# Patient Record
Sex: MALE | Race: WHITE | NOT HISPANIC OR LATINO | Employment: FULL TIME | URBAN - METROPOLITAN AREA
[De-identification: names, ages, dates, MRNs, and addresses within clinical notes are randomized per-mention and may not be internally consistent; named-entity substitution may affect disease eponyms.]

---

## 2023-03-02 LAB
AMPHETAMINE (PRESENCE) IN URINE BY SCREEN METHOD: NORMAL
BARBITURATES PRESENCE IN URINE BY SCREEN METHOD: NORMAL
BENZODIAZEPINE (PRESENCE) IN URINE BY SCREEN METHOD: NORMAL
CANNABINOIDS IN URINE BY SCREEN METHOD: NORMAL
COCAINE (PRESENCE) IN URINE BY SCREEN METHOD: NORMAL
DRUG SCREEN COMMENT URINE: NORMAL
FENTANYL URINE: NORMAL
METHADONE (PRESENCE) IN URINE BY SCREEN METHOD: NORMAL
OPIATES (PRESENCE) IN URINE BY SCREEN METHOD: NORMAL
OXYCODONE (PRESENCE) IN URINE BY SCREEN METHOD: NORMAL
PHENCYCLIDINE (PRESENCE) IN URINE BY SCREEN METHOD: NORMAL

## 2023-10-19 ENCOUNTER — OFFICE VISIT (OUTPATIENT)
Dept: SURGERY | Facility: CLINIC | Age: 33
End: 2023-10-19
Payer: COMMERCIAL

## 2023-10-19 DIAGNOSIS — R19.7 DIARRHEA, UNSPECIFIED TYPE: Primary | ICD-10-CM

## 2023-10-19 PROCEDURE — 99441 PR PHYS/QHP TELEPHONE EVALUATION 5-10 MIN: CPT | Performed by: NURSE PRACTITIONER

## 2023-10-20 NOTE — PROGRESS NOTES
Subjective   Patient ID: Ladarius Monsalve is a 33 y.o. male with a hx of UC and a total colectomy, who presents today because he is in need of a refill of his medication.    HPI  He has been doing well over the past year.  He has about 10 loose-liquid BM's daily.  No c/o any rectal bleeding but will have a little spotting at times.  No c/o any anal or abdominal pain.  No c/o any weight loss.  He takes Loperamide 3 tablets 4x/day, Lomotil 2 tablets 4x/day, Metamucil and Questran TID and that keeps his stools more bulked.  Review of Systems   All other systems reviewed and are negative.      Objective   Physical Exam    Assessment/Plan   Ladarius has done well over the years since his total colectomy.  He will continue to take his fiber and anti-diarrhea medications to keep his stools bulked.  He will call with any issues and will follow up on an as needed basis.

## 2023-10-23 PROBLEM — R19.7 DIARRHEA: Status: ACTIVE | Noted: 2023-10-23

## 2023-11-20 PROBLEM — K62.5 RECTAL BLEEDING: Status: ACTIVE | Noted: 2023-11-20

## 2023-11-20 PROBLEM — R16.1 SPLENOMEGALY: Status: ACTIVE | Noted: 2023-11-20

## 2023-11-20 PROBLEM — F90.0 ATTENTION DEFICIT HYPERACTIVITY DISORDER (ADHD), PREDOMINANTLY INATTENTIVE TYPE: Status: ACTIVE | Noted: 2023-11-20

## 2023-11-20 PROBLEM — N52.39 POST-PROCEDURAL ERECTILE DYSFUNCTION: Status: ACTIVE | Noted: 2023-11-20

## 2023-11-20 PROBLEM — R17 JAUNDICE: Status: ACTIVE | Noted: 2023-11-20

## 2023-11-20 PROBLEM — K83.01 PSC (PRIMARY SCLEROSING CHOLANGITIS) (CMS-HCC): Status: ACTIVE | Noted: 2023-11-20

## 2023-11-20 PROBLEM — J02.9 ACUTE PHARYNGITIS: Status: ACTIVE | Noted: 2023-11-20

## 2023-11-20 PROBLEM — R59.9 ENLARGED LYMPH NODES: Status: ACTIVE | Noted: 2023-11-20

## 2023-11-20 PROBLEM — K91.850: Status: ACTIVE | Noted: 2023-11-20

## 2023-11-20 PROBLEM — G93.40 ENCEPHALOPATHY: Status: ACTIVE | Noted: 2023-11-20

## 2023-11-20 PROBLEM — R39.12 WEAK URINARY STREAM: Status: ACTIVE | Noted: 2023-11-20

## 2023-11-20 PROBLEM — R17 SCLERAL ICTERUS: Status: ACTIVE | Noted: 2023-11-20

## 2023-11-20 PROBLEM — L29.9 PRURITUS: Status: ACTIVE | Noted: 2023-11-20

## 2023-11-20 PROBLEM — L29.0 PERIANAL IRRITATION: Status: ACTIVE | Noted: 2023-11-20

## 2023-11-20 PROBLEM — R10.11 ABDOMINAL PAIN, RUQ (RIGHT UPPER QUADRANT): Status: ACTIVE | Noted: 2023-11-20

## 2023-11-20 PROBLEM — K83.1 COMMON BILE DUCT (CBD) STRICTURE (CMS-HCC): Status: ACTIVE | Noted: 2023-11-20

## 2023-11-20 PROBLEM — J01.90 SINUSITIS, ACUTE: Status: ACTIVE | Noted: 2023-11-20

## 2023-11-20 PROBLEM — K62.89 ANAL PAIN: Status: ACTIVE | Noted: 2023-11-20

## 2023-11-20 PROBLEM — K92.2 GI BLEED: Status: ACTIVE | Noted: 2023-11-20

## 2023-11-20 PROBLEM — K62.89 PROCTITIS: Status: ACTIVE | Noted: 2023-11-20

## 2023-11-20 RX ORDER — PSYLLIUM HUSK 3.4 G/5.8G
POWDER ORAL
COMMUNITY
Start: 2018-07-27

## 2023-11-20 RX ORDER — METHYLPHENIDATE HYDROCHLORIDE 10 MG/1
TABLET ORAL
COMMUNITY
End: 2023-11-27

## 2023-11-20 RX ORDER — DIPHENOXYLATE HYDROCHLORIDE AND ATROPINE SULFATE 2.5; .025 MG/1; MG/1
TABLET ORAL
COMMUNITY
End: 2024-01-02 | Stop reason: SDUPTHER

## 2023-11-20 RX ORDER — LOPERAMIDE HYDROCHLORIDE 2 MG/1
CAPSULE ORAL
COMMUNITY

## 2023-11-20 RX ORDER — CIPROFLOXACIN 500 MG/1
1 TABLET ORAL EVERY 12 HOURS
COMMUNITY
Start: 2022-06-24 | End: 2024-05-29 | Stop reason: WASHOUT

## 2023-11-20 RX ORDER — CHOLESTYRAMINE 4 G/9G
POWDER, FOR SUSPENSION ORAL
COMMUNITY
Start: 2019-10-02

## 2023-11-20 RX ORDER — MESALAMINE 1000 MG/1
SUPPOSITORY RECTAL
COMMUNITY
Start: 2022-06-24

## 2023-11-20 RX ORDER — SERDEXMETHYLPHENIDATE AND DEXMETHYLPHENIDATE 10.4; 52.3 MG/1; MG/1
1 CAPSULE ORAL EVERY MORNING
COMMUNITY
End: 2023-11-27 | Stop reason: SDUPTHER

## 2023-11-20 RX ORDER — TAMSULOSIN HYDROCHLORIDE 0.4 MG/1
CAPSULE ORAL
COMMUNITY
End: 2024-03-22

## 2023-11-20 RX ORDER — METHYLPHENIDATE HYDROCHLORIDE 36 MG/1
1 TABLET ORAL
COMMUNITY
End: 2023-11-27

## 2023-11-20 RX ORDER — TADALAFIL 5 MG/1
5 TABLET ORAL DAILY
COMMUNITY
End: 2024-02-05 | Stop reason: SDUPTHER

## 2023-11-27 ENCOUNTER — OFFICE VISIT (OUTPATIENT)
Dept: NEUROLOGY | Facility: CLINIC | Age: 33
End: 2023-11-27
Payer: COMMERCIAL

## 2023-11-27 VITALS
HEIGHT: 72 IN | SYSTOLIC BLOOD PRESSURE: 112 MMHG | WEIGHT: 196.8 LBS | BODY MASS INDEX: 26.66 KG/M2 | HEART RATE: 71 BPM | DIASTOLIC BLOOD PRESSURE: 72 MMHG

## 2023-11-27 DIAGNOSIS — G93.40 ENCEPHALOPATHY: ICD-10-CM

## 2023-11-27 DIAGNOSIS — F98.8 ADD (ATTENTION DEFICIT DISORDER) WITHOUT HYPERACTIVITY: Primary | ICD-10-CM

## 2023-11-27 DIAGNOSIS — F90.0 ATTENTION DEFICIT HYPERACTIVITY DISORDER (ADHD), PREDOMINANTLY INATTENTIVE TYPE: ICD-10-CM

## 2023-11-27 PROCEDURE — 99214 OFFICE O/P EST MOD 30 MIN: CPT | Performed by: PSYCHIATRY & NEUROLOGY

## 2023-11-27 PROCEDURE — 1036F TOBACCO NON-USER: CPT | Performed by: PSYCHIATRY & NEUROLOGY

## 2023-11-27 RX ORDER — SERDEXMETHYLPHENIDATE AND DEXMETHYLPHENIDATE 10.4; 52.3 MG/1; MG/1
1 CAPSULE ORAL DAILY
Qty: 30 CAPSULE | Refills: 0 | Status: SHIPPED | OUTPATIENT
Start: 2023-11-26 | End: 2023-12-26

## 2023-11-27 RX ORDER — SERDEXMETHYLPHENIDATE AND DEXMETHYLPHENIDATE 10.4; 52.3 MG/1; MG/1
1 CAPSULE ORAL EVERY MORNING
Qty: 30 CAPSULE | Refills: 0 | Status: SHIPPED | OUTPATIENT
Start: 2023-11-27 | End: 2024-02-14 | Stop reason: SDUPTHER

## 2023-11-27 RX ORDER — SERDEXMETHYLPHENIDATE AND DEXMETHYLPHENIDATE 10.4; 52.3 MG/1; MG/1
1 CAPSULE ORAL DAILY
Qty: 30 CAPSULE | Refills: 0 | Status: SHIPPED | OUTPATIENT
Start: 2024-01-26 | End: 2024-02-14 | Stop reason: SDUPTHER

## 2023-11-27 RX ORDER — SERDEXMETHYLPHENIDATE AND DEXMETHYLPHENIDATE 10.4; 52.3 MG/1; MG/1
1 CAPSULE ORAL EVERY MORNING
Qty: 30 CAPSULE | Refills: 0 | Status: SHIPPED | OUTPATIENT
Start: 2023-11-27 | End: 2023-11-27 | Stop reason: SDUPTHER

## 2023-11-27 ASSESSMENT — ENCOUNTER SYMPTOMS
AGITATION: 0
NECK STIFFNESS: 0
HYPERACTIVE: 0
HALLUCINATIONS: 0
FEVER: 0
SEIZURES: 0
WEAKNESS: 0
SLEEP DISTURBANCE: 0
DIZZINESS: 0
CONFUSION: 0
VOMITING: 0
WHEEZING: 0
ABDOMINAL PAIN: 0
COUGH: 0
FATIGUE: 0
TROUBLE SWALLOWING: 0
BACK PAIN: 0
EYE PAIN: 0
DIFFICULTY URINATING: 0
SINUS PRESSURE: 0
PHOTOPHOBIA: 0
BRUISES/BLEEDS EASILY: 0
PALPITATIONS: 0
NECK PAIN: 0
ADENOPATHY: 0
ARTHRALGIAS: 0
SPEECH DIFFICULTY: 0
NUMBNESS: 0
FACIAL ASYMMETRY: 0
HEADACHES: 0
FREQUENCY: 0
LIGHT-HEADEDNESS: 0
NAUSEA: 0
SHORTNESS OF BREATH: 0
TREMORS: 0
JOINT SWELLING: 0
UNEXPECTED WEIGHT CHANGE: 0

## 2023-11-27 ASSESSMENT — PATIENT HEALTH QUESTIONNAIRE - PHQ9
SUM OF ALL RESPONSES TO PHQ9 QUESTIONS 1 & 2: 0
1. LITTLE INTEREST OR PLEASURE IN DOING THINGS: NOT AT ALL
2. FEELING DOWN, DEPRESSED OR HOPELESS: NOT AT ALL

## 2023-11-27 NOTE — PROGRESS NOTES
Ladarius Monsalve  33 y.o.       SUBJECTIVE    ADHD  Pertinent negatives include no abdominal pain, arthralgias, chest pain, coughing, fatigue, fever, headaches, joint swelling, nausea, neck pain, numbness, rash, vomiting or weakness.      Chandra is a 33-year-old young male who was seen today for follow-up of his encephalopathy due to attention deficit disorder with difficulty at work and home.  Since last seen he has done very well on Azstarys 52.3 mg daily and can tell a big difference when he is on medicine compared to off medicine.  No side effects from the medication.  Today's physical and neurological examination was normal.  I would like to continue his medication the way he is taking and have him come back and see us in 3 months I have discussed the controlled substance policy, abusive potential, risk benefit and the precautions to be taken which she understood      Due to technical limitations of voice recognition and human error, this note may not accurately reflect the care of the patient.    Review of Systems   Constitutional:  Negative for fatigue, fever and unexpected weight change.   HENT:  Negative for dental problem, ear pain, hearing loss, sinus pressure, tinnitus and trouble swallowing.    Eyes:  Negative for photophobia, pain and visual disturbance.   Respiratory:  Negative for cough, shortness of breath and wheezing.    Cardiovascular:  Negative for chest pain, palpitations and leg swelling.   Gastrointestinal:  Negative for abdominal pain, nausea and vomiting.   Genitourinary:  Negative for difficulty urinating, enuresis and frequency.   Musculoskeletal:  Negative for arthralgias, back pain, joint swelling, neck pain and neck stiffness.   Skin:  Negative for pallor and rash.   Allergic/Immunologic: Negative for food allergies.   Neurological:  Negative for dizziness, tremors, seizures, syncope, facial asymmetry, speech difficulty, weakness, light-headedness, numbness and headaches.    Hematological:  Negative for adenopathy. Does not bruise/bleed easily.   Psychiatric/Behavioral:  Negative for agitation, behavioral problems, confusion, hallucinations and sleep disturbance. The patient is not hyperactive.         Patient Active Problem List   Diagnosis    Diarrhea    Abdominal pain, RUQ (right upper quadrant)    Acute pharyngitis    Sinusitis, acute    Anal pain    Attention deficit hyperactivity disorder (ADHD), predominantly inattentive type    Common bile duct (CBD) stricture    Encephalopathy    Enlarged lymph nodes    GI bleed    Jaundice    Perianal irritation    Pruritus    Post-procedural erectile dysfunction    PSC (primary sclerosing cholangitis)    Proctitis    Rectal bleeding    Rectal pouchitis (CMS/HCC)    Scleral icterus    Splenomegaly    Weak urinary stream     Past Medical History:   Diagnosis Date    Other specified symptoms and signs involving the digestive system and abdomen 10/24/2019    High output ileostomy    Personal history of other diseases of the digestive system 05/16/2022    History of ulcerative colitis    Personal history of other mental and behavioral disorders 03/24/2020    History of attention deficit disorder     Past Surgical History:   Procedure Laterality Date    ILEOSTOMY CLOSURE  09/11/2017    Ileostomy Closure    OTHER SURGICAL HISTORY  09/11/2017    Laparoscopy Total Colectomy W/ Proctectomy, Ileostomy    TONSILLECTOMY  08/01/2016    Tonsillectomy       reports that he has never smoked. He has never used smokeless tobacco.    /72 (BP Location: Left arm, Patient Position: Sitting)   Pulse 71   Ht 1.829 m (6')   Wt 89.3 kg (196 lb 12.8 oz)   BMI 26.69 kg/m²     OBJECTIVE  Physical Exam/Neurological Exam   Constitutional: General appearance: no acute distress   Auscultation of Heart: Regular rate and rhythm, no murmurs, normal S1 and S2.   Carotid Arteries: Intact without any bruits.   Neck is supple.   No lymph adenopathy.   Peripheral Vascular  Exam: Pulses +2 and equal in all extremities. No swelling, varicosities, edema or tenderness to palpations.    Abdomen is soft, nondistended. No organomegaly.  Mental status: The patient was in no distress, alert, interactive and cooperative. Affect is appropriate.   Orientation: oriented to person, oriented to place and oriented to time.   Memory: recent memory intact and remote memory intact.   Attention: normal attention span and normal concentrating ability.   Language: normal comprehension and no difficulty naming common objects.   Fund of knowledge: Patient displays adequate knowledge of current events, adequate fund of knowledge regarding past history and adequate fund of knowledge regarding vocabulary.   Eyes: The ophthalmoscopic examination was normal. The fundi are visualized with normal disc margins and without.  Cranial nerve II: Visual fields full to confrontation.   Cranial nerves III, IV, and VI: Pupils round, equally reactive to light; no ptosis. EOMs intact. No nystagmus.   Cranial Nerve V: Facial sensation intact bilaterally.   Cranial nerve VII: Normal and symmetric facial strength.   Cranial nerve VIII: Hearing is intact bilaterally to finger rub / whisper.   Cranial nerves IX and X: Palate elevates symmetrically.   Cranial nerve XI: Shoulder shrug and neck rotation strength are intact.   Cranial nerve XII: Tongue midline with normal strength.   Motor: Motor exam was normal. Muscle bulk was normal in both upper and lower extremities. Muscle tone was normal in both upper and lower extremities. Muscle strength was 5/5 throughout. no abnormal or adventitious movements were present.   Deep Tendon Reflexes: left biceps 2+ , right biceps 2+, left triceps 2+, right triceps 2+, left brachioradialis 2+, right brachioradialis 2+, left patella 2+, right patella 2+, left ankle jerk 2+, right ankle jerk 2+   Plantar Reflex: Toes downgoing to plantar stimulation on the left. Toes downgoing to plantar  stimulation on the right.   Sensory Exam: Normal to light touch.   Coordination: There is no limb dystaxia and rapid alternating movements are intact.  Gait: Gait is normal without spasticity, ataxia or bradykinesia. Stance is stable with a negative Romberg.    ASSESSMENT/PLAN  Diagnoses and all orders for this visit:  ADD (attention deficit disorder) without hyperactivity  -     serdexmethylphen-dexmethylphen (Azstarys) 52.3 mg- 10.4 mg capsule; Take 1 capsule by mouth once daily.  -     serdexmethylphen-dexmethylphen (Azstarys) 52.3 mg- 10.4 mg capsule; Take 1 capsule by mouth once daily. Do not start before January 26, 2024.  -     Azstarys 52.3 mg- 10.4 mg capsule; Take 1 capsule by mouth once daily in the morning.  -     serdexmethylphen-dexmethylphen (Azstarys) 52.3 mg- 10.4 mg capsule; Take 1 capsule by mouth once daily.  -     serdexmethylphen-dexmethylphen (Azstarys) 52.3 mg- 10.4 mg capsule; Take 1 capsule by mouth once daily. Do not start before January 26, 2024.  Encephalopathy  Attention deficit hyperactivity disorder (ADHD), predominantly inattentive type        Pedro Torrez MD  11/27/2023  10:42 AM

## 2024-01-02 DIAGNOSIS — R19.7 DIARRHEA, UNSPECIFIED TYPE: Primary | ICD-10-CM

## 2024-01-02 RX ORDER — DIPHENOXYLATE HYDROCHLORIDE AND ATROPINE SULFATE 2.5; .025 MG/1; MG/1
1 TABLET ORAL
Status: CANCELLED | OUTPATIENT
Start: 2024-01-02 | End: 2024-02-01

## 2024-01-02 RX ORDER — DIPHENOXYLATE HYDROCHLORIDE AND ATROPINE SULFATE 2.5; .025 MG/1; MG/1
TABLET ORAL
Qty: 240 TABLET | Refills: 5 | Status: SHIPPED | OUTPATIENT
Start: 2024-01-02 | End: 2024-03-19

## 2024-01-08 ENCOUNTER — TELEPHONE (OUTPATIENT)
Dept: NEUROLOGY | Facility: CLINIC | Age: 34
End: 2024-01-08
Payer: COMMERCIAL

## 2024-02-05 ENCOUNTER — OFFICE VISIT (OUTPATIENT)
Dept: UROLOGY | Facility: CLINIC | Age: 34
End: 2024-02-05
Payer: COMMERCIAL

## 2024-02-05 VITALS
BODY MASS INDEX: 26.79 KG/M2 | TEMPERATURE: 97.1 F | SYSTOLIC BLOOD PRESSURE: 138 MMHG | HEIGHT: 72 IN | HEART RATE: 86 BPM | DIASTOLIC BLOOD PRESSURE: 80 MMHG | WEIGHT: 197.8 LBS

## 2024-02-05 DIAGNOSIS — N52.39 POST-PROCEDURAL ERECTILE DYSFUNCTION, UNSPECIFIED TYPE: ICD-10-CM

## 2024-02-05 DIAGNOSIS — R33.9 URINARY RETENTION: Primary | ICD-10-CM

## 2024-02-05 PROCEDURE — 1036F TOBACCO NON-USER: CPT | Performed by: NURSE PRACTITIONER

## 2024-02-05 PROCEDURE — 99213 OFFICE O/P EST LOW 20 MIN: CPT | Performed by: NURSE PRACTITIONER

## 2024-02-05 RX ORDER — TADALAFIL 5 MG/1
5 TABLET ORAL DAILY
Qty: 90 TABLET | Refills: 3 | Status: SHIPPED | OUTPATIENT
Start: 2024-02-05 | End: 2024-03-22 | Stop reason: SDUPTHER

## 2024-02-05 NOTE — PROGRESS NOTES
Subjective   Patient ID: Ladarius Monsalve is a 33 y.o. male who presents for Follow-up and Erectile Dysfunction.  Hx of ED and LUTS. Patient with history of urinary retention and severe UC status post total proctocolectomy with loop ileostomy on 9/8/16 and subsequent reversal. He had transient retention after his GI surgery. He has been maintained on tamsulosin 0.4 mg p.o. daily and was started on tadalafil 5 mg p.o. daily in 2022. Tried stopping tamsulosin which caused worsening LUTS.      Denies need for higher doses of tadalafil for ED management.           Review of Systems   All other systems reviewed and are negative.      Objective   Physical Exam  Vitals reviewed.     Alert and oriented x3  Moist mucous membranes  Breathes easily on room air  Abdomen soft, nondistended  No edema  No scleral icterus  No focal neurological deficits  Appears stated age, no acute distress      Assessment/Plan   Diagnoses and all orders for this visit:  Urinary retention  -     Post-Void Residual  -     tadalafil (Cialis) 5 mg tablet; Take 1 tablet (5 mg) by mouth once daily.  Post-procedural erectile dysfunction, unspecified type  -     tadalafil (Cialis) 5 mg tablet; Take 1 tablet (5 mg) by mouth once daily.    Continue with tamsulosin and tadalafil. Annual follow up       LAINA Wolfe-CNP 02/05/24 8:53 AM

## 2024-02-10 NOTE — PROGRESS NOTES
Subjective   Ladarius Monsalve is a 33 y.o.   male.  HPI  The patient is being seen today for their encephalopathy r/t their ADHD. They are currently taking Azstarys and it has been effective. The patient can tell when the medication wears off. He sometimes does not take on his days off or on the weekends and he can tell he gets more annoyed and agitated off the medication. The patient agrees that their quality of life has improved while taking this medication. Patient denies any chest pain, heart palpitations, sleep issues, appetite changes, weight loss, and mood changes while taking this medication. Neurological exam is normal. I have reviewed the medications and the chart. Review of systems are negative unless otherwise specified in HPI.    Objective   Neurological Exam  Mental Status  Awake, alert and oriented to person, place and time. Speech is normal. Language is fluent with no aphasia. Attention and concentration are normal.    Cranial Nerves  CN III, IV, VI: Pupils equal round and reactive to light bilaterally.  And EOM's Normal.    Motor   Strength is 5/5 throughout all four extremities.    Sensory  Light touch is normal in upper and lower extremities.     Reflexes  Deep tendon reflexes are 2+ and symmetric in all four extremities.    Gait  Casual gait is normal including stance, stride, and arm swing.    Physical Exam  Eyes:      Pupils: Pupils are equal, round, and reactive to light.   Cardiovascular:      Rate and Rhythm: Normal rate.   Neurological:      Motor: Motor strength is normal.     Deep Tendon Reflexes: Reflexes are normal and symmetric.   Psychiatric:         Speech: Speech normal.         Assessment/Plan   Discussed following up in 3 months. Medication was sent to pharmacy. Discussed with the patient the purpose of medication, as well as potential side effects to be aware of. Informed the patient about abuse potential of medication and the importance adhering to the controlled substance  agreement. Advised patient to call office with any adverse reaction to medication.

## 2024-02-14 ENCOUNTER — OFFICE VISIT (OUTPATIENT)
Dept: NEUROLOGY | Facility: CLINIC | Age: 34
End: 2024-02-14
Payer: COMMERCIAL

## 2024-02-14 VITALS
DIASTOLIC BLOOD PRESSURE: 72 MMHG | HEART RATE: 105 BPM | HEIGHT: 72 IN | SYSTOLIC BLOOD PRESSURE: 120 MMHG | WEIGHT: 196.6 LBS | BODY MASS INDEX: 26.63 KG/M2

## 2024-02-14 DIAGNOSIS — G93.40 ENCEPHALOPATHY: Primary | ICD-10-CM

## 2024-02-14 DIAGNOSIS — F98.8 ADD (ATTENTION DEFICIT DISORDER) WITHOUT HYPERACTIVITY: ICD-10-CM

## 2024-02-14 PROCEDURE — 99214 OFFICE O/P EST MOD 30 MIN: CPT

## 2024-02-14 PROCEDURE — 1036F TOBACCO NON-USER: CPT

## 2024-02-14 RX ORDER — SERDEXMETHYLPHENIDATE AND DEXMETHYLPHENIDATE 10.4; 52.3 MG/1; MG/1
1 CAPSULE ORAL DAILY
Qty: 30 CAPSULE | Refills: 0 | Status: SHIPPED | OUTPATIENT
Start: 2024-03-13 | End: 2024-04-12

## 2024-02-14 RX ORDER — SERDEXMETHYLPHENIDATE AND DEXMETHYLPHENIDATE 10.4; 52.3 MG/1; MG/1
1 CAPSULE ORAL DAILY
Qty: 30 CAPSULE | Refills: 0 | Status: SHIPPED | OUTPATIENT
Start: 2024-04-13 | End: 2024-05-08 | Stop reason: SDUPTHER

## 2024-02-14 RX ORDER — SERDEXMETHYLPHENIDATE AND DEXMETHYLPHENIDATE 10.4; 52.3 MG/1; MG/1
1 CAPSULE ORAL EVERY MORNING
Qty: 30 CAPSULE | Refills: 0 | Status: SHIPPED | OUTPATIENT
Start: 2024-02-14 | End: 2024-05-08 | Stop reason: SDUPTHER

## 2024-03-22 DIAGNOSIS — R33.9 URINARY RETENTION: ICD-10-CM

## 2024-03-22 DIAGNOSIS — N52.39 POST-PROCEDURAL ERECTILE DYSFUNCTION, UNSPECIFIED TYPE: ICD-10-CM

## 2024-03-22 DIAGNOSIS — R39.12 POOR URINARY STREAM: ICD-10-CM

## 2024-03-22 RX ORDER — TADALAFIL 5 MG/1
5 TABLET ORAL DAILY
Qty: 90 TABLET | Refills: 3 | Status: SHIPPED | OUTPATIENT
Start: 2024-03-22 | End: 2025-03-22

## 2024-03-22 RX ORDER — TAMSULOSIN HYDROCHLORIDE 0.4 MG/1
CAPSULE ORAL
Qty: 90 CAPSULE | Refills: 3 | Status: SHIPPED | OUTPATIENT
Start: 2024-03-22

## 2024-04-26 DIAGNOSIS — R19.7 DIARRHEA, UNSPECIFIED TYPE: ICD-10-CM

## 2024-04-26 RX ORDER — DIPHENOXYLATE HYDROCHLORIDE AND ATROPINE SULFATE 2.5; .025 MG/1; MG/1
TABLET ORAL
Qty: 240 TABLET | Refills: 0 | Status: SHIPPED | OUTPATIENT
Start: 2024-04-26 | End: 2024-06-04

## 2024-05-06 NOTE — PROGRESS NOTES
Subjective   Ladarius Monsalve is a 34 y.o.   male.  HPI  The patient is being seen today for their encephalopathy r/t their ADHD. They are currently taking Azstays and it has been effective. The patient can tell when the medication wears off, usually around night time. The patient agrees that their quality of life has improved while taking this medication. Patient denies any chest pain, heart palpitations, sleep issues, appetite changes, weight loss, and mood changes while taking this medication. Neurological exam is normal. I have reviewed the medications and the chart. Review of systems are negative unless otherwise specified in HPI.      Objective   Neurological Exam  Mental Status  Awake, alert and oriented to person, place and time. Speech is normal. Language is fluent with no aphasia. Attention and concentration are normal.    Cranial Nerves  CN III, IV, VI: Pupils equal round and reactive to light bilaterally.  And EOM's Normal.    Motor   Strength is 5/5 throughout all four extremities.    Sensory  Light touch is normal in upper and lower extremities.     Reflexes  Deep tendon reflexes are 2+ and symmetric in all four extremities.    Gait  Normal casual, toe, heel and tandem gait.    Physical Exam  Eyes:      Pupils: Pupils are equal, round, and reactive to light.   Neurological:      Motor: Motor strength is normal.     Deep Tendon Reflexes: Reflexes are normal and symmetric.   Psychiatric:         Speech: Speech normal.         Assessment/Plan   Discussed following up in 3 months. Medication was sent to pharmacy. Discussed with the patient the purpose of medication, as well as potential side effects to be aware of. Informed the patient about abuse potential of medication and the importance adhering to the controlled substance agreement. Advised patient to call office with any adverse reaction to medication.

## 2024-05-08 ENCOUNTER — OFFICE VISIT (OUTPATIENT)
Dept: NEUROLOGY | Facility: CLINIC | Age: 34
End: 2024-05-08
Payer: COMMERCIAL

## 2024-05-08 VITALS
HEIGHT: 72 IN | DIASTOLIC BLOOD PRESSURE: 70 MMHG | HEART RATE: 83 BPM | BODY MASS INDEX: 26.36 KG/M2 | WEIGHT: 194.6 LBS | SYSTOLIC BLOOD PRESSURE: 110 MMHG

## 2024-05-08 DIAGNOSIS — G93.40 ENCEPHALOPATHY: Primary | ICD-10-CM

## 2024-05-08 DIAGNOSIS — F98.8 ADD (ATTENTION DEFICIT DISORDER) WITHOUT HYPERACTIVITY: ICD-10-CM

## 2024-05-08 PROCEDURE — 80360 METHYLPHENIDATE: CPT

## 2024-05-08 PROCEDURE — 99214 OFFICE O/P EST MOD 30 MIN: CPT

## 2024-05-08 PROCEDURE — 1036F TOBACCO NON-USER: CPT

## 2024-05-08 RX ORDER — SERDEXMETHYLPHENIDATE AND DEXMETHYLPHENIDATE 10.4; 52.3 MG/1; MG/1
1 CAPSULE ORAL EVERY MORNING
Qty: 30 CAPSULE | Refills: 0 | Status: SHIPPED | OUTPATIENT
Start: 2024-05-08

## 2024-05-08 RX ORDER — SERDEXMETHYLPHENIDATE AND DEXMETHYLPHENIDATE 10.4; 52.3 MG/1; MG/1
1 CAPSULE ORAL DAILY
Qty: 30 CAPSULE | Refills: 0 | Status: SHIPPED | OUTPATIENT
Start: 2024-06-07 | End: 2024-07-07

## 2024-05-08 RX ORDER — SERDEXMETHYLPHENIDATE AND DEXMETHYLPHENIDATE 10.4; 52.3 MG/1; MG/1
1 CAPSULE ORAL DAILY
Qty: 30 CAPSULE | Refills: 0 | Status: SHIPPED | OUTPATIENT
Start: 2024-07-07

## 2024-05-12 LAB
ME-PHENIDATE UR-MCNC: 483 NG/ML
PPAA UR-MCNC: >5000 NG/ML

## 2024-05-26 NOTE — PROGRESS NOTES
Hepatology: Initial Office Note     Patient: Ladarius Monsalve, a 34 y.o. year old male presents for evaluation of PSC.   Used to follow up with Dr Masterson in hepatology- last seen in April 2022.   PCP: Leeanna Cannon MD    History of Present Illness   Ladarius Monsalve presents for evaluation of PSC. Used to follow up with Dr Masterson in hepatology- last seen in April 2022.     Diagnosed with PSC: 7-8 years ago.  Notes that he was diagnosed with PSC around the time of undergoing surgery for ulcerative colitis.  Recall some concern around infections which might be affecting the liver/bile duct in the postsurgical period in 2016.  This was around the time when he had the ERCPs.  Since then he denies having any complications from his liver disease are known to him.   Per patient's understanding of his disease process for the liver, he has not been aware of any concerns of advanced fibrosis or cirrhosis.  His last hepatology follow-up was in early 2022.  In December 2023 around the holidays, he had some concerns about becoming jaundiced but seem to self resolve around the new year time.  As a result he scheduled this hepatology visit to establish follow-up/care.  He denies having symptoms of itching, right upper quadrant abdominal pain, fatigue, nausea, vomiting.  Notes that he takes medications to help prevent excessive stool loss/diarrhea.  Follows up with surgery intermittently- last seen in Oct 2023.  Denies changes in appetite or weight.     History of complications of liver disease:   Cholangitis: Denies recent episodes of cholangitis.  Stricture: required stenting and dilation of mid CBD stricture and dilation of right hepatic duct stricture in 2016.   No known hx of malignant stricture.   No known hx of ascites, hepatic encephalopathy or portal HTN that are known.       Review of Systems   Constitutional/ General: No fever   Eyes: improved yellow discoloration  ENT: normal   Cardiovascular: no chest pain, no  palpitations  Respiratory: no shortness of breath  Gastrointestinal: denies abdominal pain, nausea, vomiting  Integumentary: notes dry skin, no yellow discoloration of skin  Neurologic: no weakness or numbness of extremities  Psychiatric: no mood fluctuations  Musculoskeletal: no joint swelling   Genitourinary: no dysuria, no hematuria    All other systems have been reviewed and are negative except as noted in the HPI and above.    PMHx: ulcerative colitis s/p total colectomy with proctectomy- ileostomy, ADD, high output ileostomy.  PSHx: s/p total colectomy with proctectomy- ileostomy 2016, 2017- IPAA  Social hx: alcohol use present (occasional) 1-2 drinks once a month (12 oz) beer; maybe once- twice a year 3-4 drinks in one setting, denies hx of smoking, denies hx of illicit substance use.   Family hx: Maternal grandfather with possibly stomach or pancreatic malignancy.      Medications     Current Outpatient Medications   Medication Instructions    cholestyramine (Questran) 4 gram powder oral    diphenoxylate-atropine (Lomotil) 2.5-0.025 mg tablet TAKE 2 TABLETS BY MOUTH 4 TIMES A DAY AS NEEDED    loperamide (Imodium) 2 mg capsule TAKE 3 CAPSULES BY MOUTH FOUR TIMES DAILY    mesalamine (Canasa) 1,000 mg suppository rectal    psyllium husk, aspartame, (Metamucil Sugar-Free, aspart,) 3.4 gram/5.8 gram powder oral    serdexmethylphen-dexmethylphen (Azstarys) 52.3 mg- 10.4 mg capsule 1 capsule, oral, Every morning    [START ON 6/7/2024] serdexmethylphen-dexmethylphen (Azstarys) 52.3 mg- 10.4 mg capsule 1 capsule, oral, Daily    [START ON 7/7/2024] serdexmethylphen-dexmethylphen (Azstarys) 52.3 mg- 10.4 mg capsule 1 capsule, oral, Daily    tadalafil (CIALIS) 5 mg, oral, Daily    tamsulosin (Flomax) 0.4 mg 24 hr capsule TAKE 1 CAPSULE BY MOUTH ONCE A DAY AT BEDTIME.      Physical Examination     Vitals:    05/29/24 1340   BP: 119/82   Pulse: 84   Resp: 18   Temp: 36.3 °C (97.3 °F)   SpO2: 99%     Vitals:    05/29/24  1340   Weight: 87.5 kg (193 lb)     Body mass index is 26.18 kg/m².    Constitutional: awake, alert   Eyes: EOMI, mildly icteric sclera  ENT: no oropharyngeal lesions visualized  Respiratory: Bilateral air entry equal no wheezing  Cardiovascular: regular rate and rhythm, no lower extremity edema  Abdomen: soft, non tender, non distended, bowel sounds present  Integumentary: no wounds on examined skin   Musculoskeletal: no joint swelling   Neurologic: gross motor strength intact   Psychiatric: alert, appropriate mood and affect, oriented to time/place/person    Labs     Lab Results   Component Value Date     01/18/2023    K 4.2 01/18/2023    CREATININE 0.79 01/18/2023    ALBUMIN 4.4 01/18/2023     (H) 01/18/2023     (H) 01/18/2023    ALKPHOS 561 (H) 01/18/2023    HGB 13.0 (L) 01/18/2023    PLT 80 (L) 01/18/2023      Lab Results   Component Value Date     (H) 01/18/2023     (H) 09/08/2021     (H) 04/09/2021     (H) 12/21/2020     (H) 01/16/2020     (H) 01/18/2023    AST 73 (H) 09/08/2021    AST 68 (H) 04/09/2021     (H) 12/21/2020    AST 74 (H) 01/16/2020    ALKPHOS 561 (H) 01/18/2023    ALKPHOS 488 (H) 09/08/2021    ALKPHOS 402 (H) 04/09/2021    ALKPHOS 498 (H) 12/21/2020    ALKPHOS 398 (H) 01/16/2020    BILITOT 1.4 (H) 01/18/2023    BILITOT 2.3 (H) 09/08/2021    BILITOT 2.0 (H) 04/09/2021    BILITOT 2.0 (H) 12/21/2020    BILITOT 1.5 (H) 01/16/2020    PLT 80 (L) 01/18/2023     (L) 04/09/2021     (L) 12/21/2020    WBC 2.6 (L) 01/18/2023    WBC 3.6 (L) 04/09/2021    WBC 13.3 (H) 12/21/2020    HGB 13.0 (L) 01/18/2023    HGB 15.7 04/09/2021    HGB 17.8 (H) 12/21/2020     Imaging   MRI liver Oct 2021: IMPRESSION: Intrahepatic biliary dilation throughout the right hepatic lobe with irregular beaded appearance of the dilated ductal segments compatible with the history of primary sclerosing cholangitis. Common bile duct is not  dilated.  Partially contracted gallbladder with nonspecific diffuse gallbladder wall thickening as well as small volume pericholecystic fluid/edema.    US Oct 2016:   1.  Findings suggesting strictures involving the proximal and distal common bile duct with associated intra and extrahepatic biliary dilatation.  Additionally, dependent echogenic material within the common duct could represent sludge or noncalcified stones.  No gross masses are identified.  Further evaluation with MRCP and MRI of the liver with contrast is recommended.  2.  Splenomegaly.  3.  Enlarged lymph nodes within the radha hepatis may be reactive in etiology.    April 2017: Findings: Patient is status-post total colectomy with an ileal pouch-anal anastomosis. The exam was otherwise without abnormality.    ERCP 2016 Dec: - One partially occluded stent from the biliary tree was seen in the major papilla.                         - Prior biliary endoscopic sphincterotomy appeared open.                         - One stent was removed from the biliary tree.                         - Improved CBD stricture s/p further dilation, and a stricture in the Rt main duct s/p balloon dilation.    ERCP 2016 Nov:  - Diffuse biliary strictures were found secondary to primary sclerosing cholangitis with a dominant stricture in the mid common bile duct at the take off of the cystic duct. Cytology was obtained.                         - One temporary stent was placed into the common bile duct.  Cytology: A.  COMMON BILE DUCT BRUSH: ATYPICAL CELLS ARE PRESENT, PROBABLY RELATED TO REACTIVE/REPARATIVE PROCESS.     Pouchoscopy Nov 2016: The ileoanal pouch and tramaine-terminal ileum are normal. No specimens collected.    Assessment and Plan    Ladarius TLAI Bello, a 34 y.o. year old male presents for evaluation of PSC. I have reviewed pertinent provider notes, labs and imaging studies. Discussed results and their interpretation with the patient today.    Encounter Diagnoses    Name Primary?    PSC (primary sclerosing cholangitis) (CMS-HCC) Yes    Elevated liver enzymes      Orders Placed This Encounter   Procedures    MR liver w and wo IV contrast w MR elastography    Hepatic Function Panel    CBC and Auto Differential    Basic Metabolic Panel    Protime-INR    Hepatitis A Antibody, Total    Hepatitis B Core Antibody, Total    Hepatitis B Surface Antibody    Hepatitis B Surface Antigen    Hepatitis C Antibody    IgG Subclasses (1, 2, 3, and 4)    Alpha-Fetoprotein    Cancer antigen 19-9    Enhanced Liver Fibrosis Score (ELF); [facility]; 399295 - Miscellaneous Test      # Large duct PSC: Diagnosis based on prior imaging- both MRCP and ERCP  # Hx of elevated liver enzymes: cholestatic pattern of liver enzyme elevation    On clinical evaluation today, patient does not have overt symptoms or signs of decompensated liver disease.   Elevated liver enzymes likely suggest disease activity from PSC.    Discussed the pathophysiology and natural history of primary sclerosing cholangitis with the patient.  Discussed the risk of progression of disease resulting in worsening stricturing, obstructive symptoms, risk of cholangitis, risk of cholangiocarcinoma, cirrhosis, risk of hepatocellular carcinoma.  In addition discussed risk of other malignancies including gallbladder cancer.    - Recommend labs to check LFTs, BMP, CBC, INR, tumor markers- CA 19-9, AFP, viral hep screen- hepatitis A/B/C, autoimmune serologies- TONY/ASMA/AMA/IgG and IgG4.  - Recommend MRI MRCP liver and MRE. Would like to perform fibrosis assessment with MRE and ELF. MRI MRCP to screen for stricturing ds follow up and CCA screen.   - Recommend avoiding all alcohol use.   - Recommend avoiding hepatotoxic agents- including NSAIDs and herbal supplements.   - Pt advised on symptoms to look out for, to reach out to my office.     Follow up visit in 6 months.

## 2024-05-29 ENCOUNTER — OFFICE VISIT (OUTPATIENT)
Dept: GASTROENTEROLOGY | Facility: HOSPITAL | Age: 34
End: 2024-05-29
Payer: COMMERCIAL

## 2024-05-29 VITALS
HEART RATE: 84 BPM | OXYGEN SATURATION: 99 % | WEIGHT: 193 LBS | HEIGHT: 72 IN | SYSTOLIC BLOOD PRESSURE: 119 MMHG | BODY MASS INDEX: 26.14 KG/M2 | RESPIRATION RATE: 18 BRPM | DIASTOLIC BLOOD PRESSURE: 82 MMHG | TEMPERATURE: 97.3 F

## 2024-05-29 DIAGNOSIS — K83.01 PSC (PRIMARY SCLEROSING CHOLANGITIS) (CMS-HCC): Primary | ICD-10-CM

## 2024-05-29 DIAGNOSIS — R74.8 ELEVATED LIVER ENZYMES: ICD-10-CM

## 2024-05-29 PROCEDURE — 99215 OFFICE O/P EST HI 40 MIN: CPT | Performed by: STUDENT IN AN ORGANIZED HEALTH CARE EDUCATION/TRAINING PROGRAM

## 2024-05-29 PROCEDURE — 1036F TOBACCO NON-USER: CPT | Performed by: STUDENT IN AN ORGANIZED HEALTH CARE EDUCATION/TRAINING PROGRAM

## 2024-05-29 ASSESSMENT — PAIN SCALES - GENERAL: PAINLEVEL: 0-NO PAIN

## 2024-05-29 NOTE — PATIENT INSTRUCTIONS
Ladarius Monsalve,     Thank you for coming in for your hepatology office visit today. As per our discussion, I recommend:     I recommend having labs done for evaluation of the liver.   Have an MRI MRCP and MR elastography of the liver done. Call [phone] to schedule this.   Monitor for any symptoms of abdominal pain, nausea, vomiting, fever, chills, fatigue, jaundice. Reach out to my office in case of any symptoms.   Avoid all alcohol use.   Pending results, will advise you on additional recommendations/testing if needed.     I would like to see you back in the office in 6 months.   If you are not provided with a date for your follow up visit at the end of your encounter today at the check out desk, I would encourage you to call [phone] to schedule your appointment with me.   If you have any trouble or need assistance with having this done, please reach out to my 's office at [phone] (Ms Isadora Magaña) or my nurse coordinator at [phone] (Ms Ondina GALICIA).     I look forward to seeing you again. Thank you for allowing me to participate in your care.     Sincerely,  Lisa Crenshaw MD  Hepatology

## 2024-06-06 ENCOUNTER — LAB (OUTPATIENT)
Dept: LAB | Facility: LAB | Age: 34
End: 2024-06-06
Payer: COMMERCIAL

## 2024-06-06 DIAGNOSIS — K83.01 PSC (PRIMARY SCLEROSING CHOLANGITIS) (CMS-HCC): ICD-10-CM

## 2024-06-06 DIAGNOSIS — R74.8 ELEVATED LIVER ENZYMES: ICD-10-CM

## 2024-06-06 LAB
AFP SERPL-MCNC: <4 NG/ML (ref 0–9)
ALBUMIN SERPL BCP-MCNC: 4.4 G/DL (ref 3.4–5)
ALP SERPL-CCNC: 580 U/L (ref 33–120)
ALT SERPL W P-5'-P-CCNC: 264 U/L (ref 10–52)
ANION GAP SERPL CALC-SCNC: 12 MMOL/L (ref 10–20)
AST SERPL W P-5'-P-CCNC: 139 U/L (ref 9–39)
BASOPHILS # BLD AUTO: 0.03 X10*3/UL (ref 0–0.1)
BASOPHILS NFR BLD AUTO: 1.5 %
BILIRUB DIRECT SERPL-MCNC: 1 MG/DL (ref 0–0.3)
BILIRUB SERPL-MCNC: 2.9 MG/DL (ref 0–1.2)
BUN SERPL-MCNC: 13 MG/DL (ref 6–23)
CALCIUM SERPL-MCNC: 9.3 MG/DL (ref 8.6–10.3)
CANCER AG19-9 SERPL-ACNC: <4 U/ML
CHLORIDE SERPL-SCNC: 104 MMOL/L (ref 98–107)
CO2 SERPL-SCNC: 26 MMOL/L (ref 21–32)
CREAT SERPL-MCNC: 0.94 MG/DL (ref 0.5–1.3)
EGFRCR SERPLBLD CKD-EPI 2021: >90 ML/MIN/1.73M*2
EOSINOPHIL # BLD AUTO: 0.14 X10*3/UL (ref 0–0.7)
EOSINOPHIL NFR BLD AUTO: 7 %
ERYTHROCYTE [DISTWIDTH] IN BLOOD BY AUTOMATED COUNT: 19 % (ref 11.5–14.5)
GLUCOSE SERPL-MCNC: 91 MG/DL (ref 74–99)
HAV AB SER QL IA: REACTIVE
HBV CORE AB SER QL: NONREACTIVE
HBV SURFACE AB SER-ACNC: 86.2 MIU/ML
HBV SURFACE AG SERPL QL IA: NONREACTIVE
HCT VFR BLD AUTO: 37.4 % (ref 41–52)
HCV AB SER QL: NONREACTIVE
HGB BLD-MCNC: 12 G/DL (ref 13.5–17.5)
IGA SERPL-MCNC: 139 MG/DL (ref 70–400)
IGG SERPL-MCNC: 1270 MG/DL (ref 700–1600)
IGG SERPL-MCNC: 1270 MG/DL (ref 700–1600)
IGG1 SER-MCNC: 860 MG/DL (ref 490–1140)
IGG2 SER-MCNC: 400 MG/DL (ref 150–640)
IGG3 SER-MCNC: 37 MG/DL (ref 11–85)
IGG4 SER-MCNC: 96 MG/DL (ref 3–200)
IGM SERPL-MCNC: 157 MG/DL (ref 40–230)
IMM GRANULOCYTES # BLD AUTO: 0 X10*3/UL (ref 0–0.7)
IMM GRANULOCYTES NFR BLD AUTO: 0 % (ref 0–0.9)
INR PPP: 1.1 (ref 0.9–1.1)
LYMPHOCYTES # BLD AUTO: 0.62 X10*3/UL (ref 1.2–4.8)
LYMPHOCYTES NFR BLD AUTO: 31 %
MCH RBC QN AUTO: 25.9 PG (ref 26–34)
MCHC RBC AUTO-ENTMCNC: 32.1 G/DL (ref 32–36)
MCV RBC AUTO: 81 FL (ref 80–100)
MONOCYTES # BLD AUTO: 0.17 X10*3/UL (ref 0.1–1)
MONOCYTES NFR BLD AUTO: 8.5 %
NEUTROPHILS # BLD AUTO: 1.04 X10*3/UL (ref 1.2–7.7)
NEUTROPHILS NFR BLD AUTO: 52 %
NRBC BLD-RTO: 0 /100 WBCS (ref 0–0)
PLATELET # BLD AUTO: 67 X10*3/UL (ref 150–450)
POTASSIUM SERPL-SCNC: 4 MMOL/L (ref 3.5–5.3)
PROT SERPL-MCNC: 7.2 G/DL (ref 6.4–8.2)
PROTHROMBIN TIME: 11.9 SECONDS (ref 9.8–12.8)
RBC # BLD AUTO: 4.64 X10*6/UL (ref 4.5–5.9)
SODIUM SERPL-SCNC: 138 MMOL/L (ref 136–145)
WBC # BLD AUTO: 2 X10*3/UL (ref 4.4–11.3)

## 2024-06-06 PROCEDURE — 80053 COMPREHEN METABOLIC PANEL: CPT

## 2024-06-06 PROCEDURE — 86301 IMMUNOASSAY TUMOR CA 19-9: CPT

## 2024-06-06 PROCEDURE — 86803 HEPATITIS C AB TEST: CPT

## 2024-06-06 PROCEDURE — 36415 COLL VENOUS BLD VENIPUNCTURE: CPT

## 2024-06-06 PROCEDURE — 82105 ALPHA-FETOPROTEIN SERUM: CPT

## 2024-06-06 PROCEDURE — 82784 ASSAY IGA/IGD/IGG/IGM EACH: CPT

## 2024-06-06 PROCEDURE — 86038 ANTINUCLEAR ANTIBODIES: CPT

## 2024-06-06 PROCEDURE — 86708 HEPATITIS A ANTIBODY: CPT

## 2024-06-06 PROCEDURE — 86015 ACTIN ANTIBODY EACH: CPT

## 2024-06-06 PROCEDURE — 86381 MITOCHONDRIAL ANTIBODY EACH: CPT

## 2024-06-06 PROCEDURE — 86706 HEP B SURFACE ANTIBODY: CPT

## 2024-06-06 PROCEDURE — 85025 COMPLETE CBC W/AUTO DIFF WBC: CPT

## 2024-06-06 PROCEDURE — 85610 PROTHROMBIN TIME: CPT

## 2024-06-06 PROCEDURE — 86704 HEP B CORE ANTIBODY TOTAL: CPT

## 2024-06-06 PROCEDURE — 82248 BILIRUBIN DIRECT: CPT

## 2024-06-06 PROCEDURE — 87340 HEPATITIS B SURFACE AG IA: CPT

## 2024-06-10 LAB
ANA SER QL HEP2 SUBST: NEGATIVE
MITOCHONDRIA AB SER QL IF: NEGATIVE
SMOOTH MUSCLE AB SER QL IF: NEGATIVE

## 2024-06-11 LAB — SCAN RESULT: ABNORMAL

## 2024-06-20 ENCOUNTER — HOSPITAL ENCOUNTER (OUTPATIENT)
Dept: RADIOLOGY | Facility: HOSPITAL | Age: 34
Discharge: HOME | End: 2024-06-20
Payer: COMMERCIAL

## 2024-06-20 DIAGNOSIS — R74.8 ELEVATED LIVER ENZYMES: ICD-10-CM

## 2024-06-20 DIAGNOSIS — K83.01 PSC (PRIMARY SCLEROSING CHOLANGITIS) (CMS-HCC): ICD-10-CM

## 2024-06-20 PROCEDURE — 2550000001 HC RX 255 CONTRASTS: Performed by: STUDENT IN AN ORGANIZED HEALTH CARE EDUCATION/TRAINING PROGRAM

## 2024-06-20 PROCEDURE — 74183 MRI ABD W/O CNTR FLWD CNTR: CPT

## 2024-06-20 PROCEDURE — A9575 INJ GADOTERATE MEGLUMI 0.1ML: HCPCS | Performed by: STUDENT IN AN ORGANIZED HEALTH CARE EDUCATION/TRAINING PROGRAM

## 2024-06-20 RX ORDER — GADOTERATE MEGLUMINE 376.9 MG/ML
20 INJECTION INTRAVENOUS
Status: COMPLETED | OUTPATIENT
Start: 2024-06-20 | End: 2024-06-20

## 2024-06-25 ENCOUNTER — TELEPHONE (OUTPATIENT)
Dept: GASTROENTEROLOGY | Facility: CLINIC | Age: 34
End: 2024-06-25
Payer: COMMERCIAL

## 2024-06-25 DIAGNOSIS — R74.8 ELEVATED LIVER ENZYMES: ICD-10-CM

## 2024-06-25 DIAGNOSIS — K83.01 PSC (PRIMARY SCLEROSING CHOLANGITIS) (CMS-HCC): Primary | ICD-10-CM

## 2024-06-25 DIAGNOSIS — K83.01 PSC (PRIMARY SCLEROSING CHOLANGITIS) (CMS-HCC): ICD-10-CM

## 2024-06-25 RX ORDER — URSODIOL 300 MG/1
600 CAPSULE ORAL 2 TIMES DAILY
Qty: 120 CAPSULE | Refills: 3 | Status: SHIPPED | OUTPATIENT
Start: 2024-06-25 | End: 2024-10-23

## 2024-06-25 NOTE — TELEPHONE ENCOUNTER
----- Message from Lisa Crenshaw MD sent at 6/25/2024 12:40 PM EDT -----  Regarding: FW: Fatigue  Contact: [phone]  You can advise pt on results from the MRI (sent a result task).   Had relayed his lab results to him via [employer].   Plausible that with active PSC he is having symptoms of fatigue. In the absence of abdominal pain, fever, chills- recommend monitoring. Let me know if he has any symptoms.   In addition recommend trial of ursodiol 600mg BID to see if this helps the ALP. This is not approved for PSC- but if there is a response to ALP reduction, will use this med.     Thanks    ----- Message -----  From: Ondina Hickman RN  Sent: 6/25/2024  11:38 AM EDT  To: Lisa Crenshaw MD  Subject: FW: Fatigue                                        ----- Message -----  From: Arleen Cyr RN  Sent: 6/25/2024  10:05 AM EDT  To: Ondina Hickman RN  Subject: FW: Fatigue                                        ----- Message -----  From: Ladarius Monsalve  Sent: 6/24/2024   3:27 PM EDT  To: [facility] Clinical Support Staff  Subject: Fatigue                                          Dr. Crenshaw,    I just wanted to reach out to make you aware I have been experiencing fatigue a few different times since my office visit. I'm not able to think of anything that might be causing me to have these spells of tiredness, and we talked about me reaching out if I began to experience fatigue.

## 2024-06-25 NOTE — TELEPHONE ENCOUNTER
----- Message from Lisa Crenshaw MD sent at 6/20/2024 11:15 AM EDT -----  Please advise pt that labs show elevated LFTs and slight elevation in t bili. Thrombocytopenia has worsened over time. Has stable Hb and WBC (mildly low). Evidence of immunity to hep A and B. Negative autoimmune serologies. Lab abnormalities in the setting PSC. Tumor markers are negative. ELF notes moderate risk of significant fibrosis.   He is planned for MRI liver this month, pending results- will review additional recommendations.   Recommend MELD lab check in 2 months. Can you check with pt if he has been on ursodiol in the past? Thanks.

## 2024-06-25 NOTE — TELEPHONE ENCOUNTER
Hepatology Nurse Coordinator Note  Called patient to provide results and recommendations from Dr. Crenshaw, as well as to address his [employer] message.     Patient is aware that his labs show elevated LFTs and slight elevation in his T Bili. He's aware his thrombocytopenia has worsened over time. He's aware his hemoglobin and WBC are stable, with WBC count mildly low. He's aware lab abnormalities are in the setting of PSC. He's aware tumor markers are negative. He's aware he has evidence of immunity to hepatitis A and B. He's aware autoimmune serologies are negative. Reviewed ELF results, which note moderate risk of significant fibrosis. Patient is aware their Liver MRI shows changes of PSC. He's aware the MRI estimates stage II disease and fibrosis.     Reviewed the following recommendations with patient from Dr. Crenshaw:   -Get MELD labs in 2 months.   -Trial Ursodiol 600 mg BID to see if there is a response on LFTs/ALP. Patient is aware this is not approved for PSC, but if there is a response to ALP reduction, will use this medication.  Patient states he has not been on in the past that he remembers, but is agreeable to try recommended therapy. He states his medication should be sent to [facility] in [facility] in [place].     Regarding patient's [employer] message voicing concerns over fatigue, he is aware it is plausible that with active PSC he is having symptoms of fatigue. In the absence of abdominal pain, fever, chills, Dr. Crenshaw is recommending monitoring. Patient denies any additional symptoms at this time. He's aware if he starts to develop any further symptoms, he should contact the office right away.     Patient verbalized understanding of results and recommendations.  Patient was provided RN coordinators contact information should they have any additional questions, or concerns.

## 2024-06-25 NOTE — TELEPHONE ENCOUNTER
----- Message from Lisa Crenshaw MD sent at 6/25/2024 12:38 PM EDT -----  Please advise pt that MRI liver shows changes of PSC. MRE estimates stage II disease/fibrosis.   I recommend initiation of ursodiol 600mg BID to see if there is a response on LFTs. Recommend repeat labs for MELD in 3 months.

## 2024-07-22 DIAGNOSIS — K91.850 RECTAL POUCHITIS (MULTI): Primary | ICD-10-CM

## 2024-07-22 RX ORDER — CIPROFLOXACIN 500 MG/1
500 TABLET ORAL 2 TIMES DAILY
Qty: 28 TABLET | Refills: 0 | Status: SHIPPED | OUTPATIENT
Start: 2024-07-22 | End: 2024-08-05

## 2024-07-28 NOTE — PROGRESS NOTES
Subjective   Ladarius Monsalve is a 34 y.o.   male.  HPI  The patient is being seen today for their encephalopathy r/t their ADHD. They are currently taking Aztarys and it has been effective. The patient can tell when the medication wears off, 10:30pm. The patient agrees that their quality of life has improved while taking this medication. Patient denies any chest pain, heart palpitations, sleep issues, appetite changes, weight loss, and mood changes while taking this medication. Neurological exam is normal. I have reviewed the medications and the chart. Review of systems are negative unless otherwise specified in HPI.    I have personally reviewed the OARRS report for this patient. I have considered the risks of abuse, dependence, addiction, and diversion. I believe that it is critically appropriate for this patient to be prescribed this medication based on documented diagnosis of ADHD. An updated contract between the patient and myself was signed, scanned into the EMR, and the patient agrees to the terms. Any deviation from this agreement will result in termination from my practice. CSA was signed and dated. UDS obtained, results are pending.   Objective   Neurological Exam  Mental Status  Awake, alert and oriented to person, place and time. Speech is normal. Language is fluent with no aphasia. Attention and concentration are normal.    Cranial Nerves  CN III, IV, VI: Pupils equal round and reactive to light bilaterally.  And EOM's Normal.    Motor   Strength is 5/5 throughout all four extremities.    Sensory  Light touch is normal in upper and lower extremities.     Reflexes  Deep tendon reflexes are 2+ and symmetric in all four extremities.    Gait  Casual gait is normal including stance, stride, and arm swing.Normal toe walking. Normal heel walking.    Physical Exam  Eyes:      Pupils: Pupils are equal, round, and reactive to light.   Neurological:      Motor: Motor strength is normal.     Deep Tendon  Reflexes: Reflexes are normal and symmetric.   Psychiatric:         Speech: Speech normal.           Assessment/Plan   Discussed following up in 3 months. Medication was sent to pharmacy. Discussed with the patient the purpose of medication, as well as potential side effects to be aware of. Informed the patient about abuse potential of medication and the importance adhering to the controlled substance agreement. Advised patient to call office with any adverse reaction to medication.

## 2024-07-31 ENCOUNTER — APPOINTMENT (OUTPATIENT)
Dept: NEUROLOGY | Facility: CLINIC | Age: 34
End: 2024-07-31
Payer: COMMERCIAL

## 2024-07-31 VITALS
SYSTOLIC BLOOD PRESSURE: 123 MMHG | DIASTOLIC BLOOD PRESSURE: 79 MMHG | HEIGHT: 72 IN | HEART RATE: 91 BPM | WEIGHT: 193.8 LBS | BODY MASS INDEX: 26.25 KG/M2

## 2024-07-31 DIAGNOSIS — F90.0 ATTENTION DEFICIT HYPERACTIVITY DISORDER (ADHD), PREDOMINANTLY INATTENTIVE TYPE: ICD-10-CM

## 2024-07-31 DIAGNOSIS — F98.8 ADD (ATTENTION DEFICIT DISORDER) WITHOUT HYPERACTIVITY: ICD-10-CM

## 2024-07-31 DIAGNOSIS — G93.40 ENCEPHALOPATHY: Primary | ICD-10-CM

## 2024-07-31 LAB
AMPHETAMINES UR QL SCN: NORMAL
BARBITURATES UR QL SCN: NORMAL
BENZODIAZ UR QL SCN: NORMAL
BZE UR QL SCN: NORMAL
CANNABINOIDS UR QL SCN: NORMAL
FENTANYL+NORFENTANYL UR QL SCN: NORMAL
METHADONE UR QL SCN: NORMAL
OPIATES UR QL SCN: NORMAL
OXYCODONE+OXYMORPHONE UR QL SCN: NORMAL
PCP UR QL SCN: NORMAL

## 2024-07-31 PROCEDURE — 3008F BODY MASS INDEX DOCD: CPT

## 2024-07-31 PROCEDURE — 80307 DRUG TEST PRSMV CHEM ANLYZR: CPT

## 2024-07-31 PROCEDURE — 1036F TOBACCO NON-USER: CPT

## 2024-07-31 PROCEDURE — 99214 OFFICE O/P EST MOD 30 MIN: CPT

## 2024-07-31 RX ORDER — SERDEXMETHYLPHENIDATE AND DEXMETHYLPHENIDATE 10.4; 52.3 MG/1; MG/1
1 CAPSULE ORAL EVERY MORNING
Qty: 30 CAPSULE | Refills: 0 | Status: SHIPPED | OUTPATIENT
Start: 2024-07-31

## 2024-07-31 RX ORDER — SERDEXMETHYLPHENIDATE AND DEXMETHYLPHENIDATE 10.4; 52.3 MG/1; MG/1
1 CAPSULE ORAL DAILY
Qty: 30 CAPSULE | Refills: 0 | Status: SHIPPED | OUTPATIENT
Start: 2024-09-30

## 2024-07-31 ASSESSMENT — PATIENT HEALTH QUESTIONNAIRE - PHQ9
2. FEELING DOWN, DEPRESSED OR HOPELESS: NOT AT ALL
1. LITTLE INTEREST OR PLEASURE IN DOING THINGS: NOT AT ALL
SUM OF ALL RESPONSES TO PHQ9 QUESTIONS 1 & 2: 0

## 2024-08-01 DIAGNOSIS — R19.7 DIARRHEA, UNSPECIFIED TYPE: Primary | ICD-10-CM

## 2024-08-01 RX ORDER — CHOLESTYRAMINE 4 G/9G
POWDER, FOR SUSPENSION ORAL
Qty: 2268 G | Refills: 11 | Status: SHIPPED | OUTPATIENT
Start: 2024-08-01

## 2024-08-02 DIAGNOSIS — R19.7 DIARRHEA, UNSPECIFIED TYPE: ICD-10-CM

## 2024-08-02 RX ORDER — DIPHENOXYLATE HYDROCHLORIDE AND ATROPINE SULFATE 2.5; .025 MG/1; MG/1
2 TABLET ORAL 4 TIMES DAILY PRN
Qty: 240 TABLET | Refills: 4 | Status: SHIPPED | OUTPATIENT
Start: 2024-08-02

## 2024-09-18 ENCOUNTER — APPOINTMENT (OUTPATIENT)
Dept: DERMATOLOGY | Facility: CLINIC | Age: 34
End: 2024-09-18
Payer: COMMERCIAL

## 2024-09-18 DIAGNOSIS — D22.60 MELANOCYTIC NEVI OF UNSPECIFIED UPPER LIMB, INCLUDING SHOULDER: ICD-10-CM

## 2024-09-18 DIAGNOSIS — D18.01 HEMANGIOMA OF SKIN: ICD-10-CM

## 2024-09-18 DIAGNOSIS — L82.1 SEBORRHEIC KERATOSIS: ICD-10-CM

## 2024-09-18 DIAGNOSIS — L81.4 LENTIGO: ICD-10-CM

## 2024-09-18 DIAGNOSIS — L57.8 PHOTOAGING OF SKIN: ICD-10-CM

## 2024-09-18 DIAGNOSIS — Z12.83 ENCOUNTER FOR SCREENING FOR MALIGNANT NEOPLASM OF SKIN: Primary | ICD-10-CM

## 2024-09-18 DIAGNOSIS — L72.0 MILIA: ICD-10-CM

## 2024-09-18 DIAGNOSIS — D22.5 MELANOCYTIC NEVI OF TRUNK: ICD-10-CM

## 2024-09-18 PROCEDURE — 99203 OFFICE O/P NEW LOW 30 MIN: CPT | Performed by: DERMATOLOGY

## 2024-09-18 PROCEDURE — 1036F TOBACCO NON-USER: CPT | Performed by: DERMATOLOGY

## 2024-09-18 ASSESSMENT — DERMATOLOGY QUALITY OF LIFE (QOL) ASSESSMENT
RATE HOW BOTHERED YOU ARE BY SYMPTOMS OF YOUR SKIN PROBLEM (EG, ITCHING, STINGING BURNING, HURTING OR SKIN IRRITATION): 0 - NEVER BOTHERED
RATE HOW BOTHERED YOU ARE BY SYMPTOMS OF YOUR SKIN PROBLEM (EG, ITCHING, STINGING BURNING, HURTING OR SKIN IRRITATION): 0 - NEVER BOTHERED
DATE THE QUALITY-OF-LIFE ASSESSMENT WAS COMPLETED: 67101
RATE HOW BOTHERED YOU ARE BY EFFECTS OF YOUR SKIN PROBLEMS ON YOUR ACTIVITIES (EG, GOING OUT, ACCOMPLISHING WHAT YOU WANT, WORK ACTIVITIES OR YOUR RELATIONSHIPS WITH OTHERS): 0 - NEVER BOTHERED
RATE HOW EMOTIONALLY BOTHERED YOU ARE BY YOUR SKIN PROBLEM (FOR EXAMPLE, WORRY, EMBARRASSMENT, FRUSTRATION): 0 - NEVER BOTHERED
WHAT SINGLE SKIN CONDITION LISTED BELOW IS THE PATIENT ANSWERING THE QUALITY-OF-LIFE ASSESSMENT QUESTIONS ABOUT: NONE OF THE ABOVE
WHAT SINGLE SKIN CONDITION LISTED BELOW IS THE PATIENT ANSWERING THE QUALITY-OF-LIFE ASSESSMENT QUESTIONS ABOUT: NONE OF THE ABOVE
RATE HOW BOTHERED YOU ARE BY EFFECTS OF YOUR SKIN PROBLEMS ON YOUR ACTIVITIES (EG, GOING OUT, ACCOMPLISHING WHAT YOU WANT, WORK ACTIVITIES OR YOUR RELATIONSHIPS WITH OTHERS): 0 - NEVER BOTHERED
RATE HOW EMOTIONALLY BOTHERED YOU ARE BY YOUR SKIN PROBLEM (FOR EXAMPLE, WORRY, EMBARRASSMENT, FRUSTRATION): 0 - NEVER BOTHERED

## 2024-09-18 NOTE — PROGRESS NOTES
Subjective     Ladarius Monsalve is a 34 y.o. male who presents for the following: Skin Check (Pt here for FBSE with no reported hx. Pt reports no areas of concern at this time. ).     Review of Systems:  No other skin or systemic complaints other than what is documented elsewhere in the note.    The following portions of the chart were reviewed this encounter and updated as appropriate:         Skin Cancer History  No skin cancer on file.      Specialty Problems          Dermatology Problems    Pruritus        Objective   Well appearing patient in no apparent distress; mood and affect are within normal limits.    A full examination was performed including scalp, head, eyes, ears, nose, lips, neck, chest, axillae, abdomen, back, buttocks, bilateral upper extremities, bilateral lower extremities, hands, feet, fingers, toes, fingernails, and toenails. All findings within normal limits unless otherwise noted below.    Assessment/Plan   1. Encounter for screening for malignant neoplasm of skin  No suspicious lesions noted on examination today     The risk of chronic, cumulative sun damage and risk of development of skin cancer was reviewed today.   The importance of sun protection was reviewed: including the use of a broad spectrum sunscreen that protects against both UVA/UVB rays, with ingredients such as Zinc oxide or titanium dioxide, wearing sun protective clothing and sun avoidance. We reviewed the warning signs of non-melanoma skin cancer and ABCDEs of melanoma  Please follow up should you notice any new or changing pre-existing skin lesion.    2. Photoaging of skin  Mottled pigmentation with telangiectasias and brown reticular macules in sun exposed areas of the body.     The risk of chronic, cumulative sun damage and risk of development of skin cancer was reviewed today.   The importance of sun protection was reviewed: including the use of a broad spectrum sunscreen that protects against both UVA/UVB rays,  with ingredients such as Zinc oxide or titanium dioxide, wearing sun protective clothing and sun avoidance. We reviewed the warning signs of non-melanoma skin cancer and ABCDEs of melanoma  Please follow up should you notice any new or changing pre-existing skin lesion.    3. Seborrheic keratosis (5)  Generalized, Left Breast, Left Upper Back, Right Breast, Right Upper Back  Brown, tan waxy macules and stuck on appearing papules and plaques    The benign nature of these skin lesions reviewed, reassure provided and no further treatment needed at this time.   These lesions can be removed, if symptomatic (itching, bleeding, rubbing on clothing, painful), otherwise removal is considered cosmetic.     4. Lentigo  Scattered tan macules in sun-exposed areas.    These are benign skin lesions due to sun exposure. They will darken in response to sun exposure. They should be monitored for change in size, shape or color.  These lesions can be treated cosmetically with topical creams, liquid nitrogen and a variety of lasers.    5. Melanocytic nevi of trunk  Tan-brown symmetric macules and papules    Clinically benign appearing nevi, no treatment is necessary.  The importance of sun protection was reviewed: including the use of a broad spectrum sunscreen that protects against both UVA/UVB rays, with ingredients such as Zinc oxide or titanium dioxide, wearing sun protective clothing and sun avoidance.   ABCDEs of melanoma reviewed.  Please follow up should you notice any new or changing pre-existing skin lesion.    6. Hemangioma of skin  Cherry red papules    The benign nature of these skin lesions were reviewed, no treatment is necessary.   Please follow up for any new or pre-existing lesion that is changing in size, shape, color, becomes painful, tender, itches or bleed.    7. Milia  Mid Forehead  Small 1-2 mm white papule.    The benign nature of these skin lesions were reviewed, no treatment is necessary.   Please follow up  for any new or pre-existing lesion that is changing in size, shape, color, becomes painful, tender, itches or bleed.      8. Melanocytic nevi of unspecified upper limb, including shoulder (2)  Left Arm, Right Arm  Scattered, uniform and benign-appearing, regular brown melanocytic papules and macules.    Clinically benign appearing nevi, no treatment is necessary.  The importance of sun protection was reviewed: including the use of a broad spectrum sunscreen that protects against both UVA/UVB rays, with ingredients such as Zinc oxide or titanium dioxide, wearing sun protective clothing and sun avoidance.   ABCDEs of melanoma reviewed.  Please follow up should you notice any new or changing pre-existing skin lesion.    Follow up in 1 - 2 years for FBSE or sooner as needed    Parker Chavez MD   PGY4, Department of Dermatology    I saw and evaluated the patient. I personally obtained the key and critical portions of the history and physical exam or was physically present for key and critical portions performed by the resident/fellow. I reviewed the resident/fellow's documentation and discussed the patient with the resident/fellow. I agree with the resident/fellow's medical decision making as documented in the note.    Madeline New,

## 2024-10-23 ENCOUNTER — APPOINTMENT (OUTPATIENT)
Dept: NEUROLOGY | Facility: CLINIC | Age: 34
End: 2024-10-23
Payer: COMMERCIAL

## 2024-10-23 VITALS
HEART RATE: 84 BPM | HEIGHT: 72 IN | BODY MASS INDEX: 26.98 KG/M2 | WEIGHT: 199.2 LBS | SYSTOLIC BLOOD PRESSURE: 128 MMHG | DIASTOLIC BLOOD PRESSURE: 66 MMHG

## 2024-10-23 DIAGNOSIS — F90.0 ATTENTION DEFICIT HYPERACTIVITY DISORDER (ADHD), PREDOMINANTLY INATTENTIVE TYPE: ICD-10-CM

## 2024-10-23 DIAGNOSIS — G93.40 ENCEPHALOPATHY, UNSPECIFIED TYPE: Primary | ICD-10-CM

## 2024-10-23 PROCEDURE — 99214 OFFICE O/P EST MOD 30 MIN: CPT

## 2024-10-23 PROCEDURE — 1036F TOBACCO NON-USER: CPT

## 2024-10-23 PROCEDURE — 3008F BODY MASS INDEX DOCD: CPT

## 2024-10-23 RX ORDER — SERDEXMETHYLPHENIDATE AND DEXMETHYLPHENIDATE 10.4; 52.3 MG/1; MG/1
1 CAPSULE ORAL EVERY MORNING
Qty: 30 CAPSULE | Refills: 0 | Status: SHIPPED | OUTPATIENT
Start: 2024-12-22

## 2024-10-23 RX ORDER — SERDEXMETHYLPHENIDATE AND DEXMETHYLPHENIDATE 10.4; 52.3 MG/1; MG/1
1 CAPSULE ORAL DAILY
Qty: 30 CAPSULE | Refills: 0 | Status: SHIPPED | OUTPATIENT
Start: 2024-11-22

## 2024-10-23 ASSESSMENT — PATIENT HEALTH QUESTIONNAIRE - PHQ9
1. LITTLE INTEREST OR PLEASURE IN DOING THINGS: NOT AT ALL
2. FEELING DOWN, DEPRESSED OR HOPELESS: NOT AT ALL
SUM OF ALL RESPONSES TO PHQ9 QUESTIONS 1 & 2: 0

## 2024-10-23 NOTE — PROGRESS NOTES
Subjective   Ladarius Monsalve is a 34 y.o.   male.  HPI  The patient is being seen today for their encephalopathy r/t their ADHD. They are currently taking Azstarys and it has been effective. The patient can tell when the medication wears off. The patient agrees that their quality of life has improved while taking this medication. Patient denies any chest pain, heart palpitations, sleep issues, appetite changes, weight loss, and mood changes while taking this medication. Neurological exam is normal. I have reviewed the medications and the chart. Review of systems are negative unless otherwise specified in HPI.    Objective   Neurological Exam  Mental Status  Awake, alert and oriented to person, place and time. Speech is normal. Language is fluent with no aphasia. Attention and concentration are normal.    Cranial Nerves  CN III, IV, VI: Pupils equal round and reactive to light bilaterally.  And EOM's Normal.    Motor   Strength is 5/5 throughout all four extremities.    Sensory  Light touch is normal in upper and lower extremities.     Reflexes  Deep tendon reflexes are 2+ and symmetric in all four extremities.    Gait  Casual gait is normal including stance, stride, and arm swing.Normal toe walking. Normal heel walking.    Physical Exam  Eyes:      Pupils: Pupils are equal, round, and reactive to light.   Neurological:      Motor: Motor strength is normal.     Deep Tendon Reflexes: Reflexes are normal and symmetric.   Psychiatric:         Speech: Speech normal.           Assessment/Plan   #Encephalopathy in the setting of ADHD    Discussed following up in 3 months. Medication was sent to pharmacy. Discussed with the patient the purpose of medication, as well as potential side effects to be aware of. Informed the patient about abuse potential of medication and the importance adhering to the controlled substance agreement. Advised patient to call office with any adverse reaction to medication.

## 2024-10-24 ENCOUNTER — TELEPHONE (OUTPATIENT)
Dept: GASTROENTEROLOGY | Facility: HOSPITAL | Age: 34
End: 2024-10-24
Payer: COMMERCIAL

## 2024-10-24 ENCOUNTER — LAB (OUTPATIENT)
Dept: LAB | Facility: LAB | Age: 34
End: 2024-10-24
Payer: COMMERCIAL

## 2024-10-24 DIAGNOSIS — K83.01 PSC (PRIMARY SCLEROSING CHOLANGITIS) (CMS-HCC): Primary | ICD-10-CM

## 2024-10-24 DIAGNOSIS — R10.11 RIGHT UPPER QUADRANT ABDOMINAL PAIN: ICD-10-CM

## 2024-10-24 DIAGNOSIS — R74.8 ELEVATED LIVER ENZYMES: ICD-10-CM

## 2024-10-24 DIAGNOSIS — K83.01 PSC (PRIMARY SCLEROSING CHOLANGITIS) (CMS-HCC): ICD-10-CM

## 2024-10-24 LAB
ALBUMIN SERPL BCP-MCNC: 4.2 G/DL (ref 3.4–5)
ALP SERPL-CCNC: 522 U/L (ref 33–120)
ALT SERPL W P-5'-P-CCNC: 190 U/L (ref 10–52)
ANION GAP SERPL CALC-SCNC: 10 MMOL/L (ref 10–20)
AST SERPL W P-5'-P-CCNC: 106 U/L (ref 9–39)
BASOPHILS # BLD AUTO: 0.01 X10*3/UL (ref 0–0.1)
BASOPHILS NFR BLD AUTO: 0.6 %
BILIRUB DIRECT SERPL-MCNC: 0.7 MG/DL (ref 0–0.3)
BILIRUB SERPL-MCNC: 1.7 MG/DL (ref 0–1.2)
BUN SERPL-MCNC: 17 MG/DL (ref 6–23)
CALCIUM SERPL-MCNC: 8.8 MG/DL (ref 8.6–10.3)
CHLORIDE SERPL-SCNC: 106 MMOL/L (ref 98–107)
CO2 SERPL-SCNC: 26 MMOL/L (ref 21–32)
CREAT SERPL-MCNC: 0.77 MG/DL (ref 0.5–1.3)
EGFRCR SERPLBLD CKD-EPI 2021: >90 ML/MIN/1.73M*2
EOSINOPHIL # BLD AUTO: 0.12 X10*3/UL (ref 0–0.7)
EOSINOPHIL NFR BLD AUTO: 6.7 %
ERYTHROCYTE [DISTWIDTH] IN BLOOD BY AUTOMATED COUNT: 18 % (ref 11.5–14.5)
GLUCOSE SERPL-MCNC: 137 MG/DL (ref 74–99)
HCT VFR BLD AUTO: 36.3 % (ref 41–52)
HGB BLD-MCNC: 11.6 G/DL (ref 13.5–17.5)
IMM GRANULOCYTES # BLD AUTO: 0 X10*3/UL (ref 0–0.7)
IMM GRANULOCYTES NFR BLD AUTO: 0 % (ref 0–0.9)
INR PPP: 1 (ref 0.9–1.1)
LYMPHOCYTES # BLD AUTO: 0.55 X10*3/UL (ref 1.2–4.8)
LYMPHOCYTES NFR BLD AUTO: 30.9 %
MCH RBC QN AUTO: 26.2 PG (ref 26–34)
MCHC RBC AUTO-ENTMCNC: 32 G/DL (ref 32–36)
MCV RBC AUTO: 82 FL (ref 80–100)
MONOCYTES # BLD AUTO: 0.16 X10*3/UL (ref 0.1–1)
MONOCYTES NFR BLD AUTO: 9 %
NEUTROPHILS # BLD AUTO: 0.94 X10*3/UL (ref 1.2–7.7)
NEUTROPHILS NFR BLD AUTO: 52.8 %
NRBC BLD-RTO: 0 /100 WBCS (ref 0–0)
PLATELET # BLD AUTO: 53 X10*3/UL (ref 150–450)
POTASSIUM SERPL-SCNC: 3.8 MMOL/L (ref 3.5–5.3)
PROT SERPL-MCNC: 6.9 G/DL (ref 6.4–8.2)
PROTHROMBIN TIME: 11.6 SECONDS (ref 9.8–12.8)
RBC # BLD AUTO: 4.42 X10*6/UL (ref 4.5–5.9)
SODIUM SERPL-SCNC: 138 MMOL/L (ref 136–145)
WBC # BLD AUTO: 1.8 X10*3/UL (ref 4.4–11.3)

## 2024-10-24 PROCEDURE — 85610 PROTHROMBIN TIME: CPT

## 2024-10-24 PROCEDURE — 82248 BILIRUBIN DIRECT: CPT

## 2024-10-24 PROCEDURE — 36415 COLL VENOUS BLD VENIPUNCTURE: CPT

## 2024-10-24 PROCEDURE — 85025 COMPLETE CBC W/AUTO DIFF WBC: CPT

## 2024-10-24 PROCEDURE — 80053 COMPREHEN METABOLIC PANEL: CPT

## 2024-10-24 RX ORDER — CIPROFLOXACIN 500 MG/1
500 TABLET ORAL 2 TIMES DAILY
Qty: 10 TABLET | Refills: 0 | Status: SHIPPED | OUTPATIENT
Start: 2024-10-24 | End: 2024-10-29

## 2024-10-24 NOTE — TELEPHONE ENCOUNTER
"Hepatology Nurse Coordinator Note  Returned call to patient regarding voicemail message left regarding \"abdominal pain and requesting an antibiotic.\" Patient states he started having \"right upper abdomen pain that is constant.\" He states \"the pain got slightly better starting Monday. It was 7/10 and now 4/10.\" He states he wanted to see if he could get an antibiotic. He states he \"hasn't had this happen before.\"  He denies fever, or any other symptoms.     Patient is aware I will review concerns with Dr. Crenshaw. He is aware he was due for blood work in August. He's aware I will see if Dr. Crenshaw needs any other testing and would call back with recommendations. Patient verbalized understanding.   "

## 2024-10-24 NOTE — TELEPHONE ENCOUNTER
"Hepatology Nurse Coordinator Note  Called patient back with updated recommendations from Dr. Crenshaw. Patient is aware Dr. Crenshaw would like him to get blood work collected today. Orders updated in [facility]. Patient still has pain which he reported at \"4/10.\" He's aware Dr. Crenshaw will send Cipro 500 mg twice daily x 5 days, but he is aware he needs to get his blood work completed before starting the antibiotic. He states he will go to the lab today. He is requesting for prescription to go to [facility] in [facility] in [place]. Will make Dr. Crenshaw aware.    Patient verbalized understanding of recommendations.       "

## 2024-10-30 ENCOUNTER — TELEPHONE (OUTPATIENT)
Dept: GASTROENTEROLOGY | Facility: HOSPITAL | Age: 34
End: 2024-10-30
Payer: COMMERCIAL

## 2024-11-09 DIAGNOSIS — R19.7 DIARRHEA, UNSPECIFIED TYPE: Primary | ICD-10-CM

## 2024-11-11 RX ORDER — LOPERAMIDE HYDROCHLORIDE 2 MG/1
CAPSULE ORAL
Qty: 360 CAPSULE | Refills: 11 | Status: SHIPPED | OUTPATIENT
Start: 2024-11-11

## 2024-11-11 NOTE — PROGRESS NOTES
Hepatology: Follow up Office Note     Patient: Ladarius Monsalve, a 34 y.o. year old male presents for follow up of PSC.   Used to follow up with Dr Masterson in hepatology- last seen in April 2022.   PCP: Leeanna Cannon MD    History of Present Illness   Ladarius Monsalve presents for a follow up visit today. He transferred care to my office in June 2024 for hx of PSC. Used to follow up with Dr Masterson in hepatology- last seen in April 2022.     Diagnosed with PSC: 7-8 years ago.  Notes that he was diagnosed with PSC around the time of undergoing surgery for ulcerative colitis.  Recall some concern around infections which might be affecting the liver/bile duct in the postsurgical period in 2016.  This was around the time when he had the ERCPs.  Since then he denies having any complications from his liver disease are known to him.   Per patient's understanding of his disease process for the liver, he has not been aware of any concerns of advanced fibrosis or cirrhosis.  His last hepatology follow-up was in early 2022.  In December 2023 around the holidays, he had some concerns about becoming jaundiced but seem to self resolve around the new year time. On lomotil and Questran.        History of complications of liver disease:   Cholangitis: pt called the office in late Oct 2024 with symptoms of RUQ abdominal pain- prescribed cipro.   Stricture: required stenting and dilation of mid CBD stricture and dilation of right hepatic duct stricture in 2016.   No known hx of malignant stricture.   No known hx of ascites, hepatic encephalopathy or portal HTN that are known.      Today's visit: Here for follow-up.  Notes that after use of ciprofloxacin for 5 days he had resolution of right upper quadrant abdominal pain.  Denied having fever, chills, nausea or vomiting.     Review of Systems   Constitutional/ General: No fever   Eyes: improved yellow discoloration  ENT: normal   Cardiovascular: no chest pain, no  palpitations  Respiratory: no shortness of breath  Gastrointestinal: denies abdominal pain, nausea, vomiting  Integumentary: notes dry skin, no yellow discoloration of skin  Neurologic: no weakness or numbness of extremities  Psychiatric: no mood fluctuations  Musculoskeletal: no joint swelling   Genitourinary: no dysuria, no hematuria    All other systems have been reviewed and are negative except as noted in the HPI and above.    PMHx: ulcerative colitis s/p total colectomy with proctectomy- ileostomy, ADD, high output ileostomy.  PSHx: s/p total colectomy with proctectomy- ileostomy 2016, 2017- IPAA  Social hx: alcohol use present (occasional) 1-2 drinks once a month (12 oz) beer; maybe once- twice a year 3-4 drinks in one setting, denies hx of smoking, denies hx of illicit substance use.   Family hx: Maternal grandfather with possibly stomach or pancreatic malignancy.      Medications     Current Outpatient Medications   Medication Instructions    cholestyramine (Questran) 4 gram powder MIX 1 SCOOP OF POWDER WITH LIQUID AND TAKE BY MOUTH THREE TIMES DAILY AS DIRECTED    diphenoxylate-atropine (Lomotil) 2.5-0.025 mg tablet TAKE 2 TABLETS BY MOUTH 4 TIMES A DAY AS NEEDED    diphenoxylate-atropine (LomotiL) 2.5-0.025 mg tablet 2 tablets, oral, 4 times daily PRN    loperamide (Imodium) 2 mg capsule TAKE 3 CAPSULES BY MOUTH BEFORE MEALS AND AT BEDTIME AS NEEDED    mesalamine (Canasa) 1,000 mg suppository Insert into the rectum.    psyllium husk, aspartame, (Metamucil Sugar-Free, aspart,) 3.4 gram/5.8 gram powder Take by mouth.    [START ON 11/22/2024] serdexmethylphen-dexmethylphen (Azstarys) 52.3 mg- 10.4 mg capsule 1 capsule, oral, Daily    [START ON 12/22/2024] serdexmethylphen-dexmethylphen (Azstarys) 52.3 mg- 10.4 mg capsule 1 capsule, oral, Every morning    serdexmethylphen-dexmethylphen 52.3 mg- 10.4 mg capsule 1 capsule, oral, Daily    tadalafil (CIALIS) 5 mg, oral, Daily    tamsulosin (Flomax) 0.4 mg 24 hr  capsule TAKE 1 CAPSULE BY MOUTH ONCE A DAY AT BEDTIME.      Physical Examination     Vitals:    11/13/24 1349   BP: 116/74   Pulse: 82   Temp: 36.5 °C (97.7 °F)   SpO2: 98%     Vitals:    11/13/24 1349   Weight: 87.6 kg (193 lb 1.6 oz)     Body mass index is 26.19 kg/m².    Constitutional: awake, alert   Eyes: EOMI, anicteric sclera  ENT: no oropharyngeal lesions visualized  Respiratory: Bilateral air entry equal no wheezing  Cardiovascular: regular rate and rhythm, no lower extremity edema  Abdomen: soft, non tender, non distended, bowel sounds present  Neurologic: gross motor strength intact   Psychiatric: alert, appropriate mood and affect, oriented to time/place/person    Labs     Lab Results   Component Value Date     10/24/2024    K 3.8 10/24/2024    CREATININE 0.77 10/24/2024    ALBUMIN 4.2 10/24/2024     (H) 10/24/2024     (H) 10/24/2024    ALKPHOS 522 (H) 10/24/2024    INR 1.0 10/24/2024    AFP <4 06/06/2024    HGB 11.6 (L) 10/24/2024    PLT 53 (L) 10/24/2024      Lab Results   Component Value Date     (H) 10/24/2024     (H) 06/06/2024     (H) 01/18/2023     (H) 09/08/2021     (H) 04/09/2021     (H) 10/24/2024     (H) 06/06/2024     (H) 01/18/2023    AST 73 (H) 09/08/2021    AST 68 (H) 04/09/2021    ALKPHOS 522 (H) 10/24/2024    ALKPHOS 580 (H) 06/06/2024    ALKPHOS 561 (H) 01/18/2023    ALKPHOS 488 (H) 09/08/2021    ALKPHOS 402 (H) 04/09/2021    BILITOT 1.7 (H) 10/24/2024    BILITOT 2.9 (H) 06/06/2024    BILITOT 1.4 (H) 01/18/2023    BILITOT 2.3 (H) 09/08/2021    BILITOT 2.0 (H) 04/09/2021     <4.00 06/06/2024    AFP <4 06/06/2024    PLT 53 (L) 10/24/2024    PLT 67 (L) 06/06/2024    PLT 80 (L) 01/18/2023    WBC 1.8 (L) 10/24/2024    WBC 2.0 (L) 06/06/2024    WBC 2.6 (L) 01/18/2023    HGB 11.6 (L) 10/24/2024    HGB 12.0 (L) 06/06/2024    HGB 13.0 (L) 01/18/2023     Imaging   MRI MRE liver June 2024: IMPRESSION:  1. Overall  stable appearance of the liver compared to 10/21/2021 including right-sided intrahepatic biliary ductal dilatation with  beaded appearance of the dilated biliary ducts, compatible with history of primary sclerosing cholangitis. No new hepatic abnormality  is evident.  2. Partially contracted gallbladder with pericholecystic fluid appear similar to prior.  3. Interval increased splenomegaly.  4. MR elastography results compatible with stage 1-2, borderline stage 2-3 fibrosis as detailed above.    MRI liver Oct 2021: IMPRESSION: Intrahepatic biliary dilation throughout the right hepatic lobe with irregular beaded appearance of the dilated ductal segments compatible with the history of primary sclerosing cholangitis. Common bile duct is not dilated.  Partially contracted gallbladder with nonspecific diffuse gallbladder wall thickening as well as small volume pericholecystic fluid/edema.    US Oct 2016:   1.  Findings suggesting strictures involving the proximal and distal common bile duct with associated intra and extrahepatic biliary dilatation.  Additionally, dependent echogenic material within the common duct could represent sludge or noncalcified stones.  No gross masses are identified.  Further evaluation with MRCP and MRI of the liver with contrast is recommended.  2.  Splenomegaly.  3.  Enlarged lymph nodes within the radha hepatis may be reactive in etiology.    April 2017: Findings: Patient is status-post total colectomy with an ileal pouch-anal anastomosis. The exam was otherwise without abnormality.    ERCP 2016 Dec: - One partially occluded stent from the biliary tree was seen in the major papilla.                         - Prior biliary endoscopic sphincterotomy appeared open.                         - One stent was removed from the biliary tree.                         - Improved CBD stricture s/p further dilation, and a stricture in the Rt main duct s/p balloon dilation.    ERCP 2016 Nov:  - Diffuse  biliary strictures were found secondary to primary sclerosing cholangitis with a dominant stricture in the mid common bile duct at the take off of the cystic duct. Cytology was obtained.                         - One temporary stent was placed into the common bile duct.  Cytology: A.  COMMON BILE DUCT BRUSH: ATYPICAL CELLS ARE PRESENT, PROBABLY RELATED TO REACTIVE/REPARATIVE PROCESS.     Pouchoscopy [id number].  Pouchoscopy Nov 2016: The ileoanal pouch and tramaine-terminal ileum are normal. No specimens collected.    Assessment and Plan    Ladarius Monsalve, a 34 y.o. year old male presents for follow up of PSC. I have reviewed pertinent provider notes, labs and imaging studies. Discussed results and their interpretation with the patient today.    Encounter Diagnoses   Name Primary?    PSC (primary sclerosing cholangitis) (CMS-HCC) Yes    Elevated liver enzymes     Liver fibrosis      Orders Placed This Encounter   Procedures    MR abdomen w and wo IV contrast MRCP    Alpha-Fetoprotein    Basic Metabolic Panel    CBC and Auto Differential    Hepatic Function Panel    Protime-INR    Cancer antigen 19-9    Basic Metabolic Panel    CBC and Auto Differential    Hepatic Function Panel    Protime-INR      # Large duct PSC: Diagnosis based on prior imaging- both MRCP and ERCP  # Hx of elevated liver enzymes: cholestatic pattern of liver enzyme elevation    On clinical evaluation today, patient does not have overt symptoms or signs of decompensated liver disease.   Elevated liver enzymes suggests disease activity from PSC.  An episode of right upper quadrant abdominal pain in the past month, given patient's underlying disease history prescribed p.o. antibiotic therapy with ciprofloxacin resulting in resolution of symptoms.    MRI elastography of the liver performed in June 2024 estimates fibrosis staging up to 2-3.    Discussed the pathophysiology and natural history of primary sclerosing cholangitis with the patient.   Discussed the risk of progression of disease resulting in worsening stricturing, obstructive symptoms, risk of cholangitis, risk of cholangiocarcinoma, cirrhosis, risk of hepatocellular carcinoma.  In addition discussed risk of other malignancies including gallbladder cancer.    - Recommend labs to check LFTs, BMP, CBC, INR in 3 months and check of tumor markers- CA 19-9, AFP every 6 months.  - Recommend an MRI MRCP liver in June 2024.  - Recommend avoiding all alcohol use-patient notes that he has been abstaining from alcohol use since his initial visit.   - Recommend avoiding hepatotoxic agents- including NSAIDs and herbal supplements.     Follow up visit in 6 months.

## 2024-11-13 ENCOUNTER — OFFICE VISIT (OUTPATIENT)
Dept: GASTROENTEROLOGY | Facility: HOSPITAL | Age: 34
End: 2024-11-13
Payer: COMMERCIAL

## 2024-11-13 VITALS
OXYGEN SATURATION: 98 % | TEMPERATURE: 97.7 F | DIASTOLIC BLOOD PRESSURE: 74 MMHG | BODY MASS INDEX: 26.19 KG/M2 | HEART RATE: 82 BPM | SYSTOLIC BLOOD PRESSURE: 116 MMHG | WEIGHT: 193.1 LBS

## 2024-11-13 DIAGNOSIS — R74.8 ELEVATED LIVER ENZYMES: ICD-10-CM

## 2024-11-13 DIAGNOSIS — K74.00 LIVER FIBROSIS: ICD-10-CM

## 2024-11-13 DIAGNOSIS — K83.01 PSC (PRIMARY SCLEROSING CHOLANGITIS) (CMS-HCC): Primary | ICD-10-CM

## 2024-11-13 PROCEDURE — 99214 OFFICE O/P EST MOD 30 MIN: CPT | Performed by: STUDENT IN AN ORGANIZED HEALTH CARE EDUCATION/TRAINING PROGRAM

## 2024-11-13 SDOH — ECONOMIC STABILITY: FOOD INSECURITY: WITHIN THE PAST 12 MONTHS, YOU WORRIED THAT YOUR FOOD WOULD RUN OUT BEFORE YOU GOT MONEY TO BUY MORE.: NEVER TRUE

## 2024-11-13 SDOH — ECONOMIC STABILITY: FOOD INSECURITY: WITHIN THE PAST 12 MONTHS, THE FOOD YOU BOUGHT JUST DIDN'T LAST AND YOU DIDN'T HAVE MONEY TO GET MORE.: NEVER TRUE

## 2024-11-13 ASSESSMENT — PAIN SCALES - GENERAL: PAINLEVEL_OUTOF10: 0-NO PAIN

## 2024-11-13 ASSESSMENT — LIFESTYLE VARIABLES
SKIP TO QUESTIONS 9-10: 1
HOW OFTEN DO YOU HAVE SIX OR MORE DRINKS ON ONE OCCASION: NEVER
HOW MANY STANDARD DRINKS CONTAINING ALCOHOL DO YOU HAVE ON A TYPICAL DAY: PATIENT DOES NOT DRINK
HOW OFTEN DO YOU HAVE A DRINK CONTAINING ALCOHOL: NEVER
AUDIT-C TOTAL SCORE: 0

## 2024-11-13 NOTE — PATIENT INSTRUCTIONS
Ladarius Monsalve,     Thank you for coming in for your hepatology office visit today. As per our discussion, I recommend:     I recommend having labs done for evaluation of the liver in Feb and June 2025.   Have an MRI MRCP of the liver done before your next follow up visit in June. Call [phone] to schedule this.   Monitor for any symptoms of abdominal pain, nausea, vomiting, fever, chills, fatigue, jaundice. Reach out to my office in case of any symptoms.   Continue to avoid all alcohol use.     I would like to see you back in the office in June 2025.  If you are not provided with a date for your follow up visit at the end of your encounter today at the check out desk, I would encourage you to call [phone] to schedule your appointment with me.   If you have any trouble or need assistance with having this done, please reach out to my 's office at [phone] (Ms Renu Paris) or my nurse coordinator at [phone] (Ms Ondina GALICIA).     I look forward to seeing you again. Thank you for allowing me to participate in your care.     Sincerely,  Lisa Crenshaw MD  Hepatology

## 2024-12-27 ENCOUNTER — TELEPHONE (OUTPATIENT)
Dept: NEUROLOGY | Facility: CLINIC | Age: 34
End: 2024-12-27
Payer: COMMERCIAL

## 2025-01-22 ENCOUNTER — APPOINTMENT (OUTPATIENT)
Dept: NEUROLOGY | Facility: CLINIC | Age: 35
End: 2025-01-22
Payer: COMMERCIAL

## 2025-01-22 VITALS
SYSTOLIC BLOOD PRESSURE: 130 MMHG | WEIGHT: 199 LBS | HEIGHT: 72 IN | DIASTOLIC BLOOD PRESSURE: 62 MMHG | HEART RATE: 83 BPM | BODY MASS INDEX: 26.95 KG/M2

## 2025-01-22 DIAGNOSIS — F90.0 ATTENTION DEFICIT HYPERACTIVITY DISORDER (ADHD), PREDOMINANTLY INATTENTIVE TYPE: ICD-10-CM

## 2025-01-22 DIAGNOSIS — G93.40 ENCEPHALOPATHY, UNSPECIFIED TYPE: Primary | ICD-10-CM

## 2025-01-22 PROCEDURE — 99214 OFFICE O/P EST MOD 30 MIN: CPT

## 2025-01-22 PROCEDURE — 1036F TOBACCO NON-USER: CPT

## 2025-01-22 PROCEDURE — 3008F BODY MASS INDEX DOCD: CPT

## 2025-01-22 RX ORDER — SERDEXMETHYLPHENIDATE AND DEXMETHYLPHENIDATE 10.4; 52.3 MG/1; MG/1
1 CAPSULE ORAL DAILY
Qty: 30 CAPSULE | Refills: 0 | Status: SHIPPED | OUTPATIENT
Start: 2025-03-21

## 2025-01-22 RX ORDER — SERDEXMETHYLPHENIDATE AND DEXMETHYLPHENIDATE 10.4; 52.3 MG/1; MG/1
1 CAPSULE ORAL EVERY MORNING
Qty: 30 CAPSULE | Refills: 0 | Status: SHIPPED | OUTPATIENT
Start: 2025-02-21

## 2025-01-22 NOTE — PROGRESS NOTES
Subjective   Ladarius Monsalve is a 34 y.o.   male.  HPI  The patient is being seen today for their encephalopathy r/t their ADHD. They are currently taking Azstarys and it has been effective. The patient can tell when the medication wears off, usually lasts all day. The patient agrees that their quality of life has improved while taking this medication. Patient denies any chest pain, heart palpitations, sleep issues, appetite changes, weight loss, and mood changes while taking this medication. I have reviewed the medications and the chart. Review of systems are negative unless otherwise specified in HPI.    Objective   Neurological Exam  Mental Status  Awake, alert and oriented to person, place and time. Speech is normal. Language is fluent with no aphasia. Attention and concentration are normal.    Cranial Nerves  CN III, IV, VI: Pupils equal round and reactive to light bilaterally.  And EOM's Normal.    Motor   Strength is 5/5 throughout all four extremities.    Sensory  Light touch is normal in upper and lower extremities.     Reflexes  Deep tendon reflexes are 2+ and symmetric in all four extremities.    Gait  Casual gait is normal including stance, stride, and arm swing.Normal toe walking. Normal heel walking.    Physical Exam  Eyes:      Pupils: Pupils are equal, round, and reactive to light.   Neurological:      Motor: Motor strength is normal.     Deep Tendon Reflexes: Reflexes are normal and symmetric.   Psychiatric:         Speech: Speech normal.           Assessment/Plan   #Encephalopathy in the setting of ADHD    Discussed following up in 3 months. Medication was sent to pharmacy. Discussed with the patient the purpose of medication, as well as potential side effects to be aware of. Informed the patient about abuse potential of medication and the importance adhering to the controlled substance agreement. Advised patient to call office with any adverse reaction to medication.

## 2025-01-31 DIAGNOSIS — R19.7 DIARRHEA, UNSPECIFIED TYPE: ICD-10-CM

## 2025-01-31 RX ORDER — DIPHENOXYLATE HYDROCHLORIDE AND ATROPINE SULFATE 2.5; .025 MG/1; MG/1
2 TABLET ORAL 4 TIMES DAILY PRN
Qty: 240 TABLET | Refills: 5 | Status: SHIPPED | OUTPATIENT
Start: 2025-01-31

## 2025-02-06 ENCOUNTER — APPOINTMENT (OUTPATIENT)
Dept: UROLOGY | Facility: CLINIC | Age: 35
End: 2025-02-06
Payer: COMMERCIAL

## 2025-02-06 VITALS — HEART RATE: 81 BPM | TEMPERATURE: 97.1 F | DIASTOLIC BLOOD PRESSURE: 86 MMHG | SYSTOLIC BLOOD PRESSURE: 146 MMHG

## 2025-02-06 DIAGNOSIS — N52.39 POST-PROCEDURAL ERECTILE DYSFUNCTION, UNSPECIFIED TYPE: ICD-10-CM

## 2025-02-06 DIAGNOSIS — R39.12 POOR URINARY STREAM: ICD-10-CM

## 2025-02-06 DIAGNOSIS — R33.9 URINARY RETENTION: ICD-10-CM

## 2025-02-06 LAB
POC APPEARANCE, URINE: CLEAR
POC BILIRUBIN, URINE: ABNORMAL
POC BLOOD, URINE: NEGATIVE
POC COLOR, URINE: ABNORMAL
POC GLUCOSE, URINE: NEGATIVE MG/DL
POC KETONES, URINE: NEGATIVE MG/DL
POC LEUKOCYTES, URINE: NEGATIVE
POC NITRITE,URINE: NEGATIVE
POC PH, URINE: 5.5 PH
POC PROTEIN, URINE: NEGATIVE MG/DL
POC SPECIFIC GRAVITY, URINE: >=1.03
POC UROBILINOGEN, URINE: 0.2 EU/DL

## 2025-02-06 PROCEDURE — 81003 URINALYSIS AUTO W/O SCOPE: CPT | Performed by: NURSE PRACTITIONER

## 2025-02-06 PROCEDURE — 99213 OFFICE O/P EST LOW 20 MIN: CPT | Performed by: NURSE PRACTITIONER

## 2025-02-06 PROCEDURE — 1036F TOBACCO NON-USER: CPT | Performed by: NURSE PRACTITIONER

## 2025-02-06 RX ORDER — TADALAFIL 20 MG/1
10 TABLET ORAL
Qty: 90 TABLET | Refills: 3 | Status: SHIPPED | OUTPATIENT
Start: 2025-02-06 | End: 2029-01-16

## 2025-02-06 RX ORDER — TADALAFIL 5 MG/1
5 TABLET ORAL DAILY
Qty: 90 TABLET | Refills: 3 | Status: SHIPPED | OUTPATIENT
Start: 2025-02-06 | End: 2026-02-06

## 2025-02-06 RX ORDER — TAMSULOSIN HYDROCHLORIDE 0.4 MG/1
0.4 CAPSULE ORAL DAILY
Qty: 90 CAPSULE | Refills: 3 | Status: SHIPPED | OUTPATIENT
Start: 2025-02-06 | End: 2026-02-06

## 2025-02-06 NOTE — PROGRESS NOTES
Subjective   Patient ID: Ladarius Monsalve is a 34 y.o. male who presents for Urinary Retention (Annual Follow Up ).  Hx of ED and LUTS. Patient with history of urinary retention and severe UC status post total proctocolectomy with loop ileostomy on 9/8/16 and subsequent reversal. He had transient retention after his GI surgery. He has been maintained on tamsulosin 0.4 mg p.o. daily and was started on tadalafil 5 mg p.o. daily in 2022. Tried stopping tamsulosin which caused worsening LUTS.      Mild ED. Sometimes 5mg tadalafil works well, as needed 10mg at times.     Denies side effects with medications.        Review of Systems   All other systems reviewed and are negative.      Objective   Physical Exam  Vitals reviewed.     Alert and oriented x3  Moist mucous membranes  Breathes easily on room air  Abdomen soft, nondistended  No edema  No scleral icterus  No focal neurological deficits  Appears stated age, no acute distress    PVR=79ml     Office Visit on 02/06/2025   Component Date Value Ref Range Status    POC Color, Urine 02/06/2025 Cindy (A)  Straw, Yellow, Light-Yellow Final    POC Appearance, Urine 02/06/2025 Clear  Clear Final    POC Glucose, Urine 02/06/2025 NEGATIVE  NEGATIVE mg/dl Final    POC Bilirubin, Urine 02/06/2025 SMALL (1+) (A)  NEGATIVE Final    POC Ketones, Urine 02/06/2025 NEGATIVE  NEGATIVE mg/dl Final    POC Specific Gravity, Urine 02/06/2025 >=1.030  1.005 - 1.035 Final    POC Blood, Urine 02/06/2025 NEGATIVE  NEGATIVE Final    POC PH, Urine 02/06/2025 5.5  No Reference Range Established PH Final    POC Protein, Urine 02/06/2025 NEGATIVE  NEGATIVE mg/dl Final    POC Urobilinogen, Urine 02/06/2025 0.2  0.2, 1.0 EU/DL Final    Poc Nitrite, Urine 02/06/2025 NEGATIVE  NEGATIVE Final    POC Leukocytes, Urine 02/06/2025 NEGATIVE  NEGATIVE Final         Assessment/Plan   Diagnoses and all orders for this visit:  Urinary retention  -     POCT UA Automated manually resulted  -     Post-Void  Residual  -     tadalafil (Cialis) 5 mg tablet; Take 1 tablet (5 mg) by mouth once daily.  Poor urinary stream  -     tamsulosin (Flomax) 0.4 mg 24 hr capsule; Take 1 capsule (0.4 mg) by mouth once daily.  Post-procedural erectile dysfunction, unspecified type  -     tadalafil (Cialis) 5 mg tablet; Take 1 tablet (5 mg) by mouth once daily.  -     tadalafil (Cialis) 20 mg tablet; Take 0.5 tablets (10 mg) by mouth every other day if needed for erectile dysfunction.    Pleased with symptoms at present. Given higher doses PRN for ED. Discussed appropriate usage. Plan for continued annual follow up or sooner if needed. Patient verbalized understanding of plan.        LAINA Wolfe-CNP 02/06/25 8:46 AM

## 2025-02-21 LAB
AFP-TM SERPL-MCNC: 3.9 NG/ML
ALBUMIN SERPL-MCNC: 4.5 G/DL (ref 3.6–5.1)
ALBUMIN/GLOB SERPL: 1.6 (CALC) (ref 1–2.5)
ALP SERPL-CCNC: 571 U/L (ref 36–130)
ALT SERPL-CCNC: 315 U/L (ref 9–46)
ANION GAP SERPL CALCULATED.4IONS-SCNC: 10 MMOL/L (CALC) (ref 7–17)
AST SERPL-CCNC: 184 U/L (ref 10–40)
BASOPHILS # BLD AUTO: 31 CELLS/UL (ref 0–200)
BASOPHILS NFR BLD AUTO: 1 %
BILIRUB DIRECT SERPL-MCNC: 2.1 MG/DL
BILIRUB INDIRECT SERPL-MCNC: 2.1 MG/DL (CALC) (ref 0.2–1.2)
BILIRUB SERPL-MCNC: 4.2 MG/DL (ref 0.2–1.2)
BUN SERPL-MCNC: 16 MG/DL (ref 7–25)
BUN/CREAT SERPL: NORMAL (CALC) (ref 6–22)
CALCIUM SERPL-MCNC: 9.4 MG/DL (ref 8.6–10.3)
CANCER AG19-9 SERPL-ACNC: 3 U/ML
CHLORIDE SERPL-SCNC: 103 MMOL/L (ref 98–110)
CO2 SERPL-SCNC: 26 MMOL/L (ref 20–32)
CREAT SERPL-MCNC: 0.65 MG/DL (ref 0.6–1.26)
EGFRCR SERPLBLD CKD-EPI 2021: 128 ML/MIN/1.73M2
EOSINOPHIL # BLD AUTO: 59 CELLS/UL (ref 15–500)
EOSINOPHIL NFR BLD AUTO: 1.9 %
ERYTHROCYTE [DISTWIDTH] IN BLOOD BY AUTOMATED COUNT: 17 % (ref 11–15)
GLOBULIN SER CALC-MCNC: 2.9 G/DL (CALC) (ref 1.9–3.7)
GLUCOSE SERPL-MCNC: 76 MG/DL (ref 65–139)
HCT VFR BLD AUTO: 41.9 % (ref 38.5–50)
HGB BLD-MCNC: 13.3 G/DL (ref 13.2–17.1)
INR PPP: 1
LYMPHOCYTES # BLD AUTO: 657 CELLS/UL (ref 850–3900)
LYMPHOCYTES NFR BLD AUTO: 21.2 %
MCH RBC QN AUTO: 26.4 PG (ref 27–33)
MCHC RBC AUTO-ENTMCNC: 31.7 G/DL (ref 32–36)
MCV RBC AUTO: 83.3 FL (ref 80–100)
MONOCYTES # BLD AUTO: 220 CELLS/UL (ref 200–950)
MONOCYTES NFR BLD AUTO: 7.1 %
NEUTROPHILS # BLD AUTO: 2133 CELLS/UL (ref 1500–7800)
NEUTROPHILS NFR BLD AUTO: 68.8 %
PLATELET # BLD AUTO: 60 THOUSAND/UL (ref 140–400)
PMV BLD REES-ECKER: 10.8 FL (ref 7.5–12.5)
POTASSIUM SERPL-SCNC: 4.1 MMOL/L (ref 3.5–5.3)
PROT SERPL-MCNC: 7.4 G/DL (ref 6.1–8.1)
PROTHROMBIN TIME: 10.9 SEC (ref 9–11.5)
RBC # BLD AUTO: 5.03 MILLION/UL (ref 4.2–5.8)
SERVICE CMNT-IMP: ABNORMAL
SODIUM SERPL-SCNC: 139 MMOL/L (ref 135–146)
WBC # BLD AUTO: 3.1 THOUSAND/UL (ref 3.8–10.8)

## 2025-03-18 ENCOUNTER — HOSPITAL ENCOUNTER (OUTPATIENT)
Dept: RADIOLOGY | Facility: HOSPITAL | Age: 35
Discharge: HOME | End: 2025-03-18
Payer: COMMERCIAL

## 2025-03-18 DIAGNOSIS — K83.01 PSC (PRIMARY SCLEROSING CHOLANGITIS) (CMS-HCC): ICD-10-CM

## 2025-03-18 PROCEDURE — 74183 MRI ABD W/O CNTR FLWD CNTR: CPT

## 2025-03-18 PROCEDURE — A9575 INJ GADOTERATE MEGLUMI 0.1ML: HCPCS | Performed by: STUDENT IN AN ORGANIZED HEALTH CARE EDUCATION/TRAINING PROGRAM

## 2025-03-18 PROCEDURE — 2550000001 HC RX 255 CONTRASTS: Performed by: STUDENT IN AN ORGANIZED HEALTH CARE EDUCATION/TRAINING PROGRAM

## 2025-03-18 RX ORDER — GADOTERATE MEGLUMINE 376.9 MG/ML
20 INJECTION INTRAVENOUS
Status: COMPLETED | OUTPATIENT
Start: 2025-03-18 | End: 2025-03-18

## 2025-03-18 RX ADMIN — GADOTERATE MEGLUMINE 18 ML: 376.9 INJECTION INTRAVENOUS at 11:35

## 2025-03-26 DIAGNOSIS — R74.8 ELEVATED LIVER ENZYMES: ICD-10-CM

## 2025-03-26 DIAGNOSIS — K83.01 PSC (PRIMARY SCLEROSING CHOLANGITIS) (CMS-HCC): ICD-10-CM

## 2025-03-26 DIAGNOSIS — K74.00 LIVER FIBROSIS: ICD-10-CM

## 2025-03-29 LAB
ALBUMIN SERPL-MCNC: 4.5 G/DL (ref 3.6–5.1)
ALBUMIN/GLOB SERPL: 1.7 (CALC) (ref 1–2.5)
ALP SERPL-CCNC: 574 U/L (ref 36–130)
ALT SERPL-CCNC: 333 U/L (ref 9–46)
ANION GAP SERPL CALCULATED.4IONS-SCNC: 11 MMOL/L (CALC) (ref 7–17)
AST SERPL-CCNC: 223 U/L (ref 10–40)
BASOPHILS # BLD AUTO: 11 CELLS/UL (ref 0–200)
BASOPHILS NFR BLD AUTO: 0.5 %
BILIRUB DIRECT SERPL-MCNC: 2.6 MG/DL
BILIRUB INDIRECT SERPL-MCNC: 3 MG/DL (CALC) (ref 0.2–1.2)
BILIRUB SERPL-MCNC: 5.6 MG/DL (ref 0.2–1.2)
BUN SERPL-MCNC: 16 MG/DL (ref 7–25)
BUN/CREAT SERPL: ABNORMAL (CALC) (ref 6–22)
CALCIUM SERPL-MCNC: 9.2 MG/DL (ref 8.6–10.3)
CHLORIDE SERPL-SCNC: 104 MMOL/L (ref 98–110)
CO2 SERPL-SCNC: 26 MMOL/L (ref 20–32)
CREAT SERPL-MCNC: 0.78 MG/DL (ref 0.6–1.26)
EGFRCR SERPLBLD CKD-EPI 2021: 121 ML/MIN/1.73M2
EOSINOPHIL # BLD AUTO: 61 CELLS/UL (ref 15–500)
EOSINOPHIL NFR BLD AUTO: 2.9 %
ERYTHROCYTE [DISTWIDTH] IN BLOOD BY AUTOMATED COUNT: 17.1 % (ref 11–15)
GLOBULIN SER CALC-MCNC: 2.7 G/DL (CALC) (ref 1.9–3.7)
GLUCOSE SERPL-MCNC: 56 MG/DL (ref 65–139)
HCT VFR BLD AUTO: 39.8 % (ref 38.5–50)
HGB BLD-MCNC: 13 G/DL (ref 13.2–17.1)
INR PPP: 1
LYMPHOCYTES # BLD AUTO: 561 CELLS/UL (ref 850–3900)
LYMPHOCYTES NFR BLD AUTO: 26.7 %
MCH RBC QN AUTO: 27.8 PG (ref 27–33)
MCHC RBC AUTO-ENTMCNC: 32.7 G/DL (ref 32–36)
MCV RBC AUTO: 85.2 FL (ref 80–100)
MONOCYTES # BLD AUTO: 210 CELLS/UL (ref 200–950)
MONOCYTES NFR BLD AUTO: 10 %
NEUTROPHILS # BLD AUTO: 1258 CELLS/UL (ref 1500–7800)
NEUTROPHILS NFR BLD AUTO: 59.9 %
PLATELET # BLD AUTO: 50 THOUSAND/UL (ref 140–400)
PMV BLD REES-ECKER: 10.2 FL (ref 7.5–12.5)
POTASSIUM SERPL-SCNC: 4 MMOL/L (ref 3.5–5.3)
PROT SERPL-MCNC: 7.2 G/DL (ref 6.1–8.1)
PROTHROMBIN TIME: 11 SEC (ref 9–11.5)
RBC # BLD AUTO: 4.67 MILLION/UL (ref 4.2–5.8)
SERVICE CMNT-IMP: ABNORMAL
SODIUM SERPL-SCNC: 141 MMOL/L (ref 135–146)
WBC # BLD AUTO: 2.1 THOUSAND/UL (ref 3.8–10.8)

## 2025-04-17 ENCOUNTER — APPOINTMENT (OUTPATIENT)
Dept: NEUROLOGY | Facility: CLINIC | Age: 35
End: 2025-04-17
Payer: COMMERCIAL

## 2025-04-17 VITALS
WEIGHT: 190 LBS | HEART RATE: 74 BPM | SYSTOLIC BLOOD PRESSURE: 134 MMHG | HEIGHT: 72 IN | DIASTOLIC BLOOD PRESSURE: 72 MMHG | BODY MASS INDEX: 25.73 KG/M2

## 2025-04-17 DIAGNOSIS — F90.0 ATTENTION DEFICIT HYPERACTIVITY DISORDER (ADHD), PREDOMINANTLY INATTENTIVE TYPE: ICD-10-CM

## 2025-04-17 DIAGNOSIS — G93.40 ENCEPHALOPATHY, UNSPECIFIED TYPE: Primary | ICD-10-CM

## 2025-04-17 PROCEDURE — 99214 OFFICE O/P EST MOD 30 MIN: CPT

## 2025-04-17 PROCEDURE — 1036F TOBACCO NON-USER: CPT

## 2025-04-17 PROCEDURE — 3008F BODY MASS INDEX DOCD: CPT

## 2025-04-17 RX ORDER — SERDEXMETHYLPHENIDATE AND DEXMETHYLPHENIDATE 10.4; 52.3 MG/1; MG/1
1 CAPSULE ORAL EVERY MORNING
Qty: 30 CAPSULE | Refills: 0 | Status: SHIPPED | OUTPATIENT
Start: 2025-05-16

## 2025-04-17 RX ORDER — SERDEXMETHYLPHENIDATE AND DEXMETHYLPHENIDATE 10.4; 52.3 MG/1; MG/1
1 CAPSULE ORAL DAILY
Qty: 30 CAPSULE | Refills: 0 | Status: SHIPPED | OUTPATIENT
Start: 2025-04-17

## 2025-04-17 ASSESSMENT — PATIENT HEALTH QUESTIONNAIRE - PHQ9
2. FEELING DOWN, DEPRESSED OR HOPELESS: NOT AT ALL
SUM OF ALL RESPONSES TO PHQ9 QUESTIONS 1 & 2: 0
1. LITTLE INTEREST OR PLEASURE IN DOING THINGS: NOT AT ALL

## 2025-04-17 NOTE — PROGRESS NOTES
Subjective   Ladarius Monsalve is a 35 y.o.   male.  HPI  The patient is being seen today for their encephalopathy r/t their ADHD. They are currently taking Azstays and it has been effective. The patient can tell when the medication wears off. The patient agrees that their quality of life has improved while taking this medication. Patient denies any chest pain, heart palpitations, sleep issues, appetite changes, weight loss, and mood changes while taking this medication. I have reviewed the medications and the chart. Review of systems are negative unless otherwise specified in HPI.    I have personally reviewed the [place]AR[place] report for this patient. I have considered the risks of abuse, dependence, addiction, and diversion. I believe that it is critically appropriate for this patient to be prescribed this medication based on documented diagnosis of ADHD. An updated contract between the patient and myself was signed, scanned into the EMR, and the patient agrees to the terms. Any deviation from this agreement will result in termination from my practice. CSA was signed and dated. UDS obtained, results are pending.   Objective   Neurological Exam  Mental Status  Awake, alert and oriented to person, place and time. Speech is normal. Language is fluent with no aphasia. Attention and concentration are normal.    Cranial Nerves  CN III, IV, VI: Pupils equal round and reactive to light bilaterally.  And EOM's Normal.    Motor   Strength is 5/5 throughout all four extremities.    Sensory  Light touch is normal in upper and lower extremities.     Reflexes  Deep tendon reflexes are 2+ and symmetric in all four extremities.    Gait  Casual gait is normal including stance, stride, and arm swing.Normal toe walking. Normal heel walking.    Physical Exam  Eyes:      Pupils: Pupils are equal, round, and reactive to light.   Neurological:      Motor: Motor strength is normal.     Deep Tendon Reflexes: Reflexes are normal and symmetric.    Psychiatric:         Speech: Speech normal.         Assessment/Plan   #Encephalopathy in the setting of ADHD    Discussed following up in 3 months. Medication was sent to pharmacy. Discussed with the patient the purpose of medication, as well as potential side effects to be aware of. Informed the patient about abuse potential of medication and the importance adhering to the controlled substance agreement. Advised patient to call office with any adverse reaction to medication.

## 2025-04-18 LAB
AMPHETAMINES UR QL: NEGATIVE NG/ML
BARBITURATES UR QL: NEGATIVE NG/ML
BENZODIAZ UR QL: NEGATIVE NG/ML
BZE UR QL: NEGATIVE NG/ML
CREAT UR-MCNC: 266.4 MG/DL
FENTANYL UR QL SCN: NEGATIVE NG/ML
METHADONE UR QL: NEGATIVE NG/ML
OPIATES UR QL: NEGATIVE NG/ML
OXIDANTS UR QL: NEGATIVE MCG/ML
OXYCODONE UR QL: NEGATIVE NG/ML
PCP UR QL: NEGATIVE NG/ML
PH UR: 5.4 [PH] (ref 4.5–9)
QUEST NOTES AND COMMENTS: NORMAL
THC UR QL: NEGATIVE NG/ML

## 2025-04-30 ENCOUNTER — OFFICE VISIT (OUTPATIENT)
Dept: GASTROENTEROLOGY | Facility: HOSPITAL | Age: 35
End: 2025-04-30
Payer: COMMERCIAL

## 2025-04-30 VITALS
SYSTOLIC BLOOD PRESSURE: 109 MMHG | HEART RATE: 83 BPM | TEMPERATURE: 98.3 F | WEIGHT: 188 LBS | DIASTOLIC BLOOD PRESSURE: 77 MMHG | OXYGEN SATURATION: 98 % | BODY MASS INDEX: 25.5 KG/M2

## 2025-04-30 DIAGNOSIS — R17 JAUNDICE: Primary | ICD-10-CM

## 2025-04-30 DIAGNOSIS — K83.01 PSC (PRIMARY SCLEROSING CHOLANGITIS) (CMS-HCC): ICD-10-CM

## 2025-04-30 DIAGNOSIS — R74.8 ELEVATED LIVER ENZYMES: ICD-10-CM

## 2025-04-30 PROCEDURE — 99215 OFFICE O/P EST HI 40 MIN: CPT | Performed by: STUDENT IN AN ORGANIZED HEALTH CARE EDUCATION/TRAINING PROGRAM

## 2025-04-30 ASSESSMENT — ENCOUNTER SYMPTOMS
DEPRESSION: 0
OCCASIONAL FEELINGS OF UNSTEADINESS: 0
LOSS OF SENSATION IN FEET: 0

## 2025-04-30 ASSESSMENT — PAIN SCALES - GENERAL: PAINLEVEL_OUTOF10: 0-NO PAIN

## 2025-04-30 NOTE — PATIENT INSTRUCTIONS
Ladarius Monsalve,     Thank you for coming in for your hepatology office visit today.   As per our discussion, I recommend:     Have blood work done today.   Pending blood work, will plan an ERCP for evaluation. You can schedule this by calling [phone].   If you develop any fever, chills, abdominal pain, nausea, vomiting, please call the office.    I would like to see you back in the office in 2-3 months.   If you are not provided with a date for your follow up visit at the end of your encounter today at the check out desk, I would encourage you to call [phone] to schedule your appointment with me.   If you have any trouble or need assistance with having this done, please reach out to my office staff at [phone] or my nurse coordinator at [phone] (Ms Ondina GALICIA).     I look forward to seeing you again. Thank you for allowing me to participate in your care.     Sincerely,  Lisa Crenshaw MD  Hepatology

## 2025-04-30 NOTE — PROGRESS NOTES
Hepatology: Follow up Office Note     Patient: Ladarius Monsalve, a 35 y.o. year old male presents for follow up of PSC.   Used to follow up with Dr Masterson in hepatology- last seen in April 2022.   PCP: Leeanna Cannon MD    History of Present Illness   Ladarius Monsalve presents for a follow up visit today. He transferred care to my office in June 2024 for hx of PSC. Used to follow up with Dr Masterson in hepatology- last seen in April 2022.     Diagnosed with PSC: 7-8 years ago.  Notes that he was diagnosed with PSC around the time of undergoing surgery for ulcerative colitis.  Recall some concern around infections which might be affecting the liver/bile duct in the postsurgical period in 2016.  This was around the time when he had the ERCPs.  Since then he denies having any complications from his liver disease are known to him.   Per patient's understanding of his disease process for the liver, he has not been aware of any concerns of advanced fibrosis or cirrhosis.  His last hepatology follow-up was in early 2022.  In December 2023 around the holidays, he had some concerns about becoming jaundiced but seem to self resolve around the new year time. On lomotil and Questran.        History of complications of liver disease:   Cholangitis: pt called the office in late Oct 2024 with symptoms of RUQ abdominal pain- prescribed cipro.   Stricture: required stenting and dilation of mid CBD stricture and dilation of right hepatic duct stricture in 2016.   No known hx of malignant stricture.   No known hx of ascites, hepatic encephalopathy or portal HTN that are known.      Today's visit: Here for follow-up.  Labs in the last few months with worsening LFTs/bilirubin. Notes no episodes of fever, abdominal pain, nausea, vomiting. Had an MRCP for evaluation- without significant changes noted on MRCP. Has worsening splenomegaly.   No change in GI health/symptoms.  Notes that he uses antidiarrheals to regulate his bowel  movements.  No episodes of abdominal pain, rectal bleeding.  No episodes of pouchitis.    Review of Systems   Constitutional/ General: No fever   Eyes: Slight yellow discoloration, no changes in vision  ENT: normal   Cardiovascular: no chest pain, no palpitations  Respiratory: no shortness of breath  Gastrointestinal: denies abdominal pain, nausea, vomiting  Integumentary: notes dry skin, no yellow discoloration of skin  Neurologic: no weakness or numbness of extremities  Psychiatric: no mood fluctuations  Musculoskeletal: Notes having some joint stiffness in wrist and finger joints, last for a few minutes  Genitourinary: no dysuria, no hematuria    All other systems have been reviewed and are negative except as noted in the HPI and above.    PMHx: ulcerative colitis s/p total colectomy with proctectomy- ileostomy, ADD, high output ileostomy.  PSHx: s/p total colectomy with proctectomy- ileostomy 2016, 2017- IPAA  Social hx: alcohol use present (occasional) 1-2 drinks once a month (12 oz) beer; maybe once- twice a year 3-4 drinks in one setting, denies hx of smoking, denies hx of illicit substance use.   Family hx: Maternal grandfather with possibly stomach or pancreatic malignancy.      Medications     Current Outpatient Medications   Medication Instructions    cholestyramine (Questran) 4 gram powder MIX 1 SCOOP OF POWDER WITH LIQUID AND TAKE BY MOUTH THREE TIMES DAILY AS DIRECTED    diphenoxylate-atropine (Lomotil) 2.5-0.025 mg tablet TAKE 2 TABLETS BY MOUTH 4 TIMES A DAY AS NEEDED    diphenoxylate-atropine (Lomotil) 2.5-0.025 mg tablet 2 tablets, oral, 4 times daily PRN    loperamide (Imodium) 2 mg capsule TAKE 3 CAPSULES BY MOUTH BEFORE MEALS AND AT BEDTIME AS NEEDED    mesalamine (Canasa) 1,000 mg suppository Insert into the rectum.    psyllium husk, aspartame, (Metamucil Sugar-Free, aspart,) 3.4 gram/5.8 gram powder Take by mouth.    serdexmethylphen-dexmethylphen (Azstarys) 52.3 mg- 10.4 mg capsule 1 capsule,  oral, Daily    [START ON 5/16/2025] serdexmethylphen-dexmethylphen (Azstarys) 52.3 mg- 10.4 mg capsule 1 capsule, oral, Every morning    [START ON 6/16/2025] serdexmethylphen-dexmethylphen 52.3 mg- 10.4 mg capsule 1 capsule, oral, Daily    tadalafil (CIALIS) 5 mg, oral, Daily    tadalafil 10 mg, oral, Every 48 hours PRN    tamsulosin (FLOMAX) 0.4 mg, oral, Daily      Physical Examination     Vitals:    04/30/25 1312   BP: 109/77   Pulse: 83   Temp: 36.8 °C (98.3 °F)   SpO2: 98%     Vitals:    04/30/25 1312   Weight: 85.3 kg (188 lb)     Body mass index is 25.5 kg/m².    Constitutional: awake, alert   Eyes: EOMI, slightly icteric sclera   Respiratory: Bilateral air entry equal    Cardiovascular: regular rate and rhythm, no lower extremity edema  Abdomen: soft, non tender, non distended, no free fluid wave appreciated, bowel sounds present  Neurologic: gross motor strength intact   Psychiatric: alert, appropriate mood and affect, oriented to time/place/person    Labs     Lab Results   Component Value Date     03/28/2025    K 4.0 03/28/2025    CREATININE 0.78 03/28/2025    ALBUMIN 4.5 03/28/2025     (H) 03/28/2025     (H) 03/28/2025    ALKPHOS 574 (H) 03/28/2025    INR 1.0 03/28/2025    AFP 3.9 02/19/2025    HGB 13.0 (L) 03/28/2025    PLT 50 (L) 03/28/2025     3 02/19/2025      MELD 3.0: 14 at 3/28/2025  1:39 PM  MELD-Na: 13 at 3/28/2025  1:39 PM  Calculated from:  Serum Creatinine: 0.78 mg/dL (Using min of 1 mg/dL) at 3/28/2025  1:39 PM  Serum Sodium: 141 mmol/L (Using max of 137 mmol/L) at 3/28/2025  1:39 PM  Total Bilirubin: 5.6 mg/dL at 3/28/2025  1:39 PM  Serum Albumin: 4.5 g/dL (Using max of 3.5 g/dL) at 3/28/2025  1:39 PM  INR(ratio): 1 at 3/28/2025  1:39 PM  Age at listing (hypothetical): 34 years  Sex: Male at 3/28/2025  1:39 PM    Lab Results   Component Value Date     (H) 03/28/2025     (H) 02/19/2025     (H) 10/24/2024     (H) 06/06/2024     (H)  01/18/2023     (H) 03/28/2025     (H) 02/19/2025     (H) 10/24/2024     (H) 06/06/2024     (H) 01/18/2023    ALKPHOS 574 (H) 03/28/2025    ALKPHOS 571 (H) 02/19/2025    ALKPHOS 522 (H) 10/24/2024    ALKPHOS 580 (H) 06/06/2024    ALKPHOS 561 (H) 01/18/2023    BILITOT 5.6 (H) 03/28/2025    BILITOT 4.2 (H) 02/19/2025    BILITOT 1.7 (H) 10/24/2024    BILITOT 2.9 (H) 06/06/2024    BILITOT 1.4 (H) 01/18/2023     3 02/19/2025    AFP 3.9 02/19/2025    PLT 50 (L) 03/28/2025    PLT 60 (L) 02/19/2025    PLT 53 (L) 10/24/2024    WBC 2.1 (L) 03/28/2025    WBC 3.1 (L) 02/19/2025    WBC 1.8 (L) 10/24/2024    HGB 13.0 (L) 03/28/2025    HGB 13.3 02/19/2025    HGB 11.6 (L) 10/24/2024     Lab Results   Component Value Date    HEPBSAG Nonreactive 06/06/2024    HEPBSAB 86.2 (H) 06/06/2024    HEPBCAB Nonreactive 06/06/2024    HEPCAB Nonreactive 06/06/2024    HEPATOT Reactive (A) 06/06/2024    TONY Negative 06/06/2024    ASMAB Negative 06/06/2024    MITOAB Negative 06/06/2024    IGG 1,270 06/06/2024    IGG 1,270 06/06/2024     06/06/2024     06/06/2024      Imaging   MRI abdomen MRCP March 2025:   IMPRESSION:  1.  Similar appearance of the liver compared to 06/24/2024 with right-sided intrahepatic biliary ductal dilation and beaded appearance of the dilated biliary ducts, compatible with patient's history of primary sclerosing cholangitis.  2. No new focal arterially hyperenhancing lesion or liver mass.  3. Further interval increase in splenomegaly.  4. Redemonstration of a dilated splenic, main, right and left portal vein.  5. Minimal perihepatic and perisplenic free fluid.    MRI MRE liver June 2024: IMPRESSION:  1. Overall stable appearance of the liver compared to 10/21/2021 including right-sided intrahepatic biliary ductal dilatation with  beaded appearance of the dilated biliary ducts, compatible with history of primary sclerosing cholangitis. No new hepatic abnormality  is  evident.  2. Partially contracted gallbladder with pericholecystic fluid appear similar to prior.  3. Interval increased splenomegaly.  4. MR elastography results compatible with stage 1-2, borderline stage 2-3 fibrosis as detailed above.    MRI liver Oct 2021: IMPRESSION: Intrahepatic biliary dilation throughout the right hepatic lobe with irregular beaded appearance of the dilated ductal segments compatible with the history of primary sclerosing cholangitis. Common bile duct is not dilated.  Partially contracted gallbladder with nonspecific diffuse gallbladder wall thickening as well as small volume pericholecystic fluid/edema.    US Oct 2016:   1.  Findings suggesting strictures involving the proximal and distal common bile duct with associated intra and extrahepatic biliary dilatation.  Additionally, dependent echogenic material within the common duct could represent sludge or noncalcified stones.  No gross masses are identified.  Further evaluation with MRCP and MRI of the liver with contrast is recommended.  2.  Splenomegaly.  3.  Enlarged lymph nodes within the radha hepatis may be reactive in etiology.    April 2017: Findings: Patient is status-post total colectomy with an ileal pouch-anal anastomosis. The exam was otherwise without abnormality.    ERCP 2016 Dec: - One partially occluded stent from the biliary tree was seen in the major papilla.                         - Prior biliary endoscopic sphincterotomy appeared open.                         - One stent was removed from the biliary tree.                         - Improved CBD stricture s/p further dilation, and a stricture in the Rt main duct s/p balloon dilation.    ERCP 2016 Nov:  - Diffuse biliary strictures were found secondary to primary sclerosing cholangitis with a dominant stricture in the mid common bile duct at the take off of the cystic duct. Cytology was obtained.                         - One temporary stent was placed into the common  bile duct.  Cytology: A.  COMMON BILE DUCT BRUSH: ATYPICAL CELLS ARE PRESENT, PROBABLY RELATED TO REACTIVE/REPARATIVE PROCESS.     Pouchoscopy [id number].  Pouchoscopy Nov 2016: The ileoanal pouch and tramaine-terminal ileum are normal. No specimens collected.    Assessment and Plan    Ladarius Monsalve, a 35 y.o. year old male presents for follow up of PSC. I have reviewed pertinent provider notes, labs and imaging studies. Discussed results and their interpretation with the patient today.    Encounter Diagnoses   Name Primary?    PSC (primary sclerosing cholangitis) (CMS-HCC)     Jaundice Yes    Elevated liver enzymes      Orders Placed This Encounter   Procedures    Basic Metabolic Panel    CBC and Auto Differential    Hepatic Function Panel    Protime-INR    Sedimentation Rate    C-reactive protein      # Large duct PSC: Diagnosis based on prior imaging- both MRCP and ERCP  # Hx of elevated liver enzymes: cholestatic pattern of liver enzyme elevation    On clinical evaluation today, patient does not have overt symptoms or signs of decompensated liver disease.   MRI elastography of the liver performed in June 2024 estimates fibrosis staging up to 2-3.    In the last few months he has had worsening hyperbilirubinemia on labs.  He has not had any overt symptoms to suggest infectious complications/cholangitis.  His liver enzymes have also fluctuated with worsening ALT/AST trends.  Alkaline phosphatase remains significantly elevated.  Given the worsening hyperbilirubinemia in the setting of PSC, an MRI MRCP was performed.  This does not identify any high risk lesions in the liver or obvious focal strictures that may have worsened.  However given the degree of hyperbilirubinemia/uptrend, recommend an ERCP for further evaluation.  He has had ERCPs in 2016, at which point a dominant stricture in the CBD was noted as well as a stricture in the right main duct.  Both of these have been intervened on with balloon dilation,  stent placement and he also has a sphincterotomy in place.    Whether this is indicative of disease progression is also another possibility.  He is also noted to have worsening splenomegaly on imaging.  At this time apart from hyperbilirubinemia he has not had any overt features of decompensation from portal hypertension.     Have reviewed with patient in the past pathophysiology and natural history of primary sclerosing cholangitis with the patient.  Discussed the risk of progression of disease resulting in worsening stricturing, obstructive symptoms, risk of cholangitis, risk of cholangiocarcinoma, cirrhosis, risk of hepatocellular carcinoma.  In addition discussed risk of other malignancies including gallbladder cancer.  Again reviewed with him today that close monitoring will continue.  He is aware that in case of development of any overt complications and or continue progression of his liver disease that we may be looking at potential need for transplant evaluation in the future.    - Recommend labs to check LFTs, BMP, CBC, INR today.  Would like to assess degree of thrombocytopenia.  He does have a prior sphincterotomy.  Can proceed with ERCP as long as platelets remain stable at 50,000 or above.  Anticipate check of tumor markers- CA 19-9, AFP every 6 months.  -With ERCP would also recommend assessment of any overt changes related to portal hypertension.  - He has completed an MRI MRCP recently.  Would anticipate at least another MRI imaging in a year.  -He has abstained from all alcohol use since his initial visit.  Congratulated patient on doing so and reinforced this goal.    - Recommend avoiding hepatotoxic agents- including NSAIDs and herbal supplements.   - Will consider introduction of beta-blocker therapy in subsequent visits given concern for significant portal hypertension based on imaging.  -Patient was again educated about red flag symptoms of right upper quadrant abdominal pain, fevers, chills,  worsening jaundice.  In case of development of such symptoms he can reach out to my office and/or in case of significant symptoms present to the emergency room.    Follow up visit in 2-3 months.

## 2025-05-01 LAB
ALBUMIN SERPL-MCNC: 4.6 G/DL (ref 3.6–5.1)
ALBUMIN/GLOB SERPL: 1.8 (CALC) (ref 1–2.5)
ALP SERPL-CCNC: 512 U/L (ref 36–130)
ALT SERPL-CCNC: 272 U/L (ref 9–46)
ANION GAP SERPL CALCULATED.4IONS-SCNC: 9 MMOL/L (CALC) (ref 7–17)
AST SERPL-CCNC: 149 U/L (ref 10–40)
BASOPHILS # BLD AUTO: 21 CELLS/UL (ref 0–200)
BASOPHILS NFR BLD AUTO: 0.9 %
BILIRUB DIRECT SERPL-MCNC: 1 MG/DL
BILIRUB INDIRECT SERPL-MCNC: 1.3 MG/DL (CALC) (ref 0.2–1.2)
BILIRUB SERPL-MCNC: 2.3 MG/DL (ref 0.2–1.2)
BUN SERPL-MCNC: 17 MG/DL (ref 7–25)
BUN/CREAT SERPL: NORMAL (CALC) (ref 6–22)
CALCIUM SERPL-MCNC: 9.2 MG/DL (ref 8.6–10.3)
CHLORIDE SERPL-SCNC: 104 MMOL/L (ref 98–110)
CO2 SERPL-SCNC: 25 MMOL/L (ref 20–32)
CREAT SERPL-MCNC: 0.65 MG/DL (ref 0.6–1.26)
CRP SERPL-MCNC: <3 MG/L
EGFRCR SERPLBLD CKD-EPI 2021: 127 ML/MIN/1.73M2
EOSINOPHIL # BLD AUTO: 62 CELLS/UL (ref 15–500)
EOSINOPHIL NFR BLD AUTO: 2.7 %
ERYTHROCYTE [DISTWIDTH] IN BLOOD BY AUTOMATED COUNT: 16.1 % (ref 11–15)
ERYTHROCYTE [SEDIMENTATION RATE] IN BLOOD BY WESTERGREN METHOD: 6 MM/H
GLOBULIN SER CALC-MCNC: 2.6 G/DL (CALC) (ref 1.9–3.7)
GLUCOSE SERPL-MCNC: 91 MG/DL (ref 65–99)
HCT VFR BLD AUTO: 38.5 % (ref 38.5–50)
HGB BLD-MCNC: 12.4 G/DL (ref 13.2–17.1)
INR PPP: 1
LYMPHOCYTES # BLD AUTO: 653 CELLS/UL (ref 850–3900)
LYMPHOCYTES NFR BLD AUTO: 28.4 %
MCH RBC QN AUTO: 27.8 PG (ref 27–33)
MCHC RBC AUTO-ENTMCNC: 32.2 G/DL (ref 32–36)
MCV RBC AUTO: 86.3 FL (ref 80–100)
MONOCYTES # BLD AUTO: 235 CELLS/UL (ref 200–950)
MONOCYTES NFR BLD AUTO: 10.2 %
NEUTROPHILS # BLD AUTO: 1329 CELLS/UL (ref 1500–7800)
NEUTROPHILS NFR BLD AUTO: 57.8 %
PLATELET # BLD AUTO: 54 THOUSAND/UL (ref 140–400)
PMV BLD REES-ECKER: 12.1 FL (ref 7.5–12.5)
POTASSIUM SERPL-SCNC: 3.9 MMOL/L (ref 3.5–5.3)
PROT SERPL-MCNC: 7.2 G/DL (ref 6.1–8.1)
PROTHROMBIN TIME: 11 SEC (ref 9–11.5)
RBC # BLD AUTO: 4.46 MILLION/UL (ref 4.2–5.8)
SERVICE CMNT-IMP: ABNORMAL
SODIUM SERPL-SCNC: 138 MMOL/L (ref 135–146)
WBC # BLD AUTO: 2.3 THOUSAND/UL (ref 3.8–10.8)

## 2025-05-05 DIAGNOSIS — K74.00 LIVER FIBROSIS: ICD-10-CM

## 2025-05-05 DIAGNOSIS — R74.8 ELEVATED LIVER ENZYMES: ICD-10-CM

## 2025-05-05 DIAGNOSIS — K83.01 PSC (PRIMARY SCLEROSING CHOLANGITIS): ICD-10-CM

## 2025-05-12 ENCOUNTER — TELEPHONE (OUTPATIENT)
Dept: GASTROENTEROLOGY | Facility: HOSPITAL | Age: 35
End: 2025-05-12
Payer: COMMERCIAL

## 2025-05-12 DIAGNOSIS — R17 JAUNDICE: Primary | ICD-10-CM

## 2025-05-12 DIAGNOSIS — K83.01 PSC (PRIMARY SCLEROSING CHOLANGITIS): ICD-10-CM

## 2025-05-12 NOTE — TELEPHONE ENCOUNTER
"Hepatology Nurse Coordinator Note   Patient left a voicemail message stating he had an \"episode last week\" with his liver. Called patient back to gather additional information.     Patient states on Thursday he noticed his urine was darker and the color was \"sticking to the bowl.\" He states his \"eyes were yellow on Thursday.\" He states on Friday, symptoms were worse. He states his  He states a coworker commented on Friday that \"his skin was yellow.\" He states by Friday night it was \"very yellow and my skin was itching.\" He states usually only his palms and feet itch, but this was all over.  He states symptoms continued into Saturday and had some nausea. He states Friday he was extremely fatigued. He states he has \"some abdominal pain Thursday, but he was working out and thinks it was muscle pain related to that.\" Patient states he is still having his symptoms, but are a little bit better now. He did state he took his cholestyramine. Patient is aware I will forward to Dr. Crenshaw for review and will follow back up after. Patient verbalized understanding.       ADDENDUM 5/13/2025 9:03 AM  Per response to encounter (documented by Dr. Crenshaw), she called patient directly and addressed. Per MD, plan is to get labs and re-evaluate.   "

## 2025-05-13 ENCOUNTER — LAB REQUISITION (OUTPATIENT)
Dept: LAB | Facility: HOSPITAL | Age: 35
End: 2025-05-13
Payer: COMMERCIAL

## 2025-05-13 DIAGNOSIS — K83.01 PSC (PRIMARY SCLEROSING CHOLANGITIS): Primary | ICD-10-CM

## 2025-05-13 DIAGNOSIS — R17 JAUNDICE: ICD-10-CM

## 2025-05-13 DIAGNOSIS — K74.00 LIVER FIBROSIS: ICD-10-CM

## 2025-05-13 DIAGNOSIS — K83.01 PSC (PRIMARY SCLEROSING CHOLANGITIS): ICD-10-CM

## 2025-05-13 DIAGNOSIS — K83.01 PRIMARY SCLEROSING CHOLANGITIS: ICD-10-CM

## 2025-05-13 DIAGNOSIS — R74.8 ELEVATED LIVER ENZYMES: ICD-10-CM

## 2025-05-13 DIAGNOSIS — R17 UNSPECIFIED JAUNDICE: ICD-10-CM

## 2025-05-13 LAB
ALBUMIN SERPL BCP-MCNC: 4.2 G/DL (ref 3.4–5)
ALP SERPL-CCNC: 554 U/L (ref 33–120)
ALT SERPL W P-5'-P-CCNC: 475 U/L (ref 10–52)
ANION GAP SERPL CALC-SCNC: 11 MMOL/L (ref 10–20)
AST SERPL W P-5'-P-CCNC: 266 U/L (ref 9–39)
BILIRUB DIRECT SERPL-MCNC: 1.4 MG/DL (ref 0–0.3)
BILIRUB SERPL-MCNC: 3.9 MG/DL (ref 0–1.2)
BUN SERPL-MCNC: 14 MG/DL (ref 6–23)
CALCIUM SERPL-MCNC: 9.1 MG/DL (ref 8.6–10.3)
CHLORIDE SERPL-SCNC: 104 MMOL/L (ref 98–107)
CO2 SERPL-SCNC: 26 MMOL/L (ref 21–32)
CREAT SERPL-MCNC: 0.69 MG/DL (ref 0.5–1.3)
EGFRCR SERPLBLD CKD-EPI 2021: >90 ML/MIN/1.73M*2
ERYTHROCYTE [DISTWIDTH] IN BLOOD BY AUTOMATED COUNT: 16.6 % (ref 11.5–14.5)
GLUCOSE SERPL-MCNC: 95 MG/DL (ref 74–99)
HCT VFR BLD AUTO: 36.7 % (ref 41–52)
HGB BLD-MCNC: 12.2 G/DL (ref 13.5–17.5)
INR PPP: 1.1 (ref 0.9–1.1)
MCH RBC QN AUTO: 27.9 PG (ref 26–34)
MCHC RBC AUTO-ENTMCNC: 33.2 G/DL (ref 32–36)
MCV RBC AUTO: 84 FL (ref 80–100)
NRBC BLD-RTO: 0 /100 WBCS (ref 0–0)
PLATELET # BLD AUTO: 61 X10*3/UL (ref 150–450)
POTASSIUM SERPL-SCNC: 4.3 MMOL/L (ref 3.5–5.3)
PROT SERPL-MCNC: 6.7 G/DL (ref 6.4–8.2)
PROTHROMBIN TIME: 11.8 SECONDS (ref 9.8–12.4)
RBC # BLD AUTO: 4.37 X10*6/UL (ref 4.5–5.9)
SODIUM SERPL-SCNC: 137 MMOL/L (ref 136–145)
WBC # BLD AUTO: 2.1 X10*3/UL (ref 4.4–11.3)

## 2025-05-13 PROCEDURE — 85610 PROTHROMBIN TIME: CPT

## 2025-05-13 PROCEDURE — 85027 COMPLETE CBC AUTOMATED: CPT

## 2025-05-13 PROCEDURE — 80053 COMPREHEN METABOLIC PANEL: CPT

## 2025-05-13 PROCEDURE — 82248 BILIRUBIN DIRECT: CPT

## 2025-05-13 RX ORDER — CIPROFLOXACIN 500 MG/1
500 TABLET ORAL 2 TIMES DAILY
Qty: 10 TABLET | Refills: 0 | Status: SHIPPED | OUTPATIENT
Start: 2025-05-13 | End: 2025-05-18

## 2025-05-19 DIAGNOSIS — R74.8 ELEVATED LIVER ENZYMES: ICD-10-CM

## 2025-05-19 DIAGNOSIS — K83.01 PSC (PRIMARY SCLEROSING CHOLANGITIS): ICD-10-CM

## 2025-05-19 DIAGNOSIS — K74.00 LIVER FIBROSIS: ICD-10-CM

## 2025-05-19 DIAGNOSIS — R17 JAUNDICE: ICD-10-CM

## 2025-05-22 LAB
ALBUMIN SERPL-MCNC: 4.3 G/DL (ref 3.6–5.1)
ALBUMIN/GLOB SERPL: 1.6 (CALC) (ref 1–2.5)
ALP SERPL-CCNC: 645 U/L (ref 36–130)
ALT SERPL-CCNC: 298 U/L (ref 9–46)
AST SERPL-CCNC: 159 U/L (ref 10–40)
BASOPHILS # BLD AUTO: 21 CELLS/UL (ref 0–200)
BASOPHILS NFR BLD AUTO: 1 %
BILIRUB DIRECT SERPL-MCNC: 1.2 MG/DL
BILIRUB INDIRECT SERPL-MCNC: 1.6 MG/DL (CALC) (ref 0.2–1.2)
BILIRUB SERPL-MCNC: 2.8 MG/DL (ref 0.2–1.2)
EOSINOPHIL # BLD AUTO: 101 CELLS/UL (ref 15–500)
EOSINOPHIL NFR BLD AUTO: 4.8 %
ERYTHROCYTE [DISTWIDTH] IN BLOOD BY AUTOMATED COUNT: 16.3 % (ref 11–15)
GLOBULIN SER CALC-MCNC: 2.7 G/DL (CALC) (ref 1.9–3.7)
HCT VFR BLD AUTO: 39.9 % (ref 38.5–50)
HGB BLD-MCNC: 12.6 G/DL (ref 13.2–17.1)
LYMPHOCYTES # BLD AUTO: 664 CELLS/UL (ref 850–3900)
LYMPHOCYTES NFR BLD AUTO: 31.6 %
MCH RBC QN AUTO: 27.5 PG (ref 27–33)
MCHC RBC AUTO-ENTMCNC: 31.6 G/DL (ref 32–36)
MCV RBC AUTO: 87.1 FL (ref 80–100)
MONOCYTES # BLD AUTO: 202 CELLS/UL (ref 200–950)
MONOCYTES NFR BLD AUTO: 9.6 %
NEUTROPHILS # BLD AUTO: 1113 CELLS/UL (ref 1500–7800)
NEUTROPHILS NFR BLD AUTO: 53 %
PLATELET # BLD AUTO: 62 THOUSAND/UL (ref 140–400)
PMV BLD REES-ECKER: 11.5 FL (ref 7.5–12.5)
PROT SERPL-MCNC: 7 G/DL (ref 6.1–8.1)
RBC # BLD AUTO: 4.58 MILLION/UL (ref 4.2–5.8)
SERVICE CMNT-IMP: ABNORMAL
WBC # BLD AUTO: 2.1 THOUSAND/UL (ref 3.8–10.8)

## 2025-06-03 ENCOUNTER — ANESTHESIA (OUTPATIENT)
Dept: GASTROENTEROLOGY | Facility: HOSPITAL | Age: 35
End: 2025-06-03
Payer: COMMERCIAL

## 2025-06-03 ENCOUNTER — HOSPITAL ENCOUNTER (OUTPATIENT)
Dept: GASTROENTEROLOGY | Facility: HOSPITAL | Age: 35
Discharge: HOME | End: 2025-06-03
Payer: COMMERCIAL

## 2025-06-03 ENCOUNTER — ANESTHESIA EVENT (OUTPATIENT)
Dept: GASTROENTEROLOGY | Facility: HOSPITAL | Age: 35
End: 2025-06-03
Payer: COMMERCIAL

## 2025-06-03 VITALS
HEIGHT: 72 IN | WEIGHT: 175 LBS | RESPIRATION RATE: 14 BRPM | TEMPERATURE: 97.2 F | BODY MASS INDEX: 23.7 KG/M2 | OXYGEN SATURATION: 98 % | HEART RATE: 58 BPM | SYSTOLIC BLOOD PRESSURE: 125 MMHG | DIASTOLIC BLOOD PRESSURE: 86 MMHG

## 2025-06-03 DIAGNOSIS — K83.01 PSC (PRIMARY SCLEROSING CHOLANGITIS): ICD-10-CM

## 2025-06-03 DIAGNOSIS — R17 JAUNDICE: ICD-10-CM

## 2025-06-03 DIAGNOSIS — R74.8 ELEVATED LIVER ENZYMES: ICD-10-CM

## 2025-06-03 PROCEDURE — 43277 ERCP EA DUCT/AMPULLA DILATE: CPT | Performed by: INTERNAL MEDICINE

## 2025-06-03 PROCEDURE — C1726 CATH, BAL DIL, NON-VASCULAR: HCPCS

## 2025-06-03 PROCEDURE — 2720000007 HC OR 272 NO HCPCS

## 2025-06-03 PROCEDURE — C1729 CATH, DRAINAGE: HCPCS

## 2025-06-03 PROCEDURE — 7100000009 HC PHASE TWO TIME - INITIAL BASE CHARGE

## 2025-06-03 PROCEDURE — 7100000002 HC RECOVERY ROOM TIME - EACH INCREMENTAL 1 MINUTE

## 2025-06-03 PROCEDURE — 3700000002 HC GENERAL ANESTHESIA TIME - EACH INCREMENTAL 1 MINUTE

## 2025-06-03 PROCEDURE — 7100000001 HC RECOVERY ROOM TIME - INITIAL BASE CHARGE

## 2025-06-03 PROCEDURE — A43277 PR ERCP BALLOON DILATE BILIARY/PANC DUCT/AMPULLA EA

## 2025-06-03 PROCEDURE — A43277 PR ERCP BALLOON DILATE BILIARY/PANC DUCT/AMPULLA EA: Performed by: ANESTHESIOLOGY

## 2025-06-03 PROCEDURE — 2500000004 HC RX 250 GENERAL PHARMACY W/ HCPCS (ALT 636 FOR OP/ED)

## 2025-06-03 PROCEDURE — 7100000010 HC PHASE TWO TIME - EACH INCREMENTAL 1 MINUTE

## 2025-06-03 PROCEDURE — 88112 CYTOPATH CELL ENHANCE TECH: CPT | Mod: TC,MCY | Performed by: INTERNAL MEDICINE

## 2025-06-03 PROCEDURE — 3700000001 HC GENERAL ANESTHESIA TIME - INITIAL BASE CHARGE

## 2025-06-03 RX ORDER — LIDOCAINE HYDROCHLORIDE 20 MG/ML
INJECTION, SOLUTION INFILTRATION; PERINEURAL AS NEEDED
Status: DISCONTINUED | OUTPATIENT
Start: 2025-06-03 | End: 2025-06-03

## 2025-06-03 RX ORDER — SODIUM CHLORIDE, SODIUM GLUCONATE, SODIUM ACETATE, POTASSIUM CHLORIDE AND MAGNESIUM CHLORIDE 30; 37; 368; 526; 502 MG/100ML; MG/100ML; MG/100ML; MG/100ML; MG/100ML
INJECTION, SOLUTION INTRAVENOUS CONTINUOUS PRN
Status: DISCONTINUED | OUTPATIENT
Start: 2025-06-03 | End: 2025-06-03

## 2025-06-03 RX ORDER — SODIUM CHLORIDE, SODIUM LACTATE, POTASSIUM CHLORIDE, CALCIUM CHLORIDE 600; 310; 30; 20 MG/100ML; MG/100ML; MG/100ML; MG/100ML
100 INJECTION, SOLUTION INTRAVENOUS CONTINUOUS
Status: SHIPPED | OUTPATIENT
Start: 2025-06-03 | End: 2025-06-03

## 2025-06-03 RX ORDER — PROPOFOL 10 MG/ML
INJECTION, EMULSION INTRAVENOUS AS NEEDED
Status: DISCONTINUED | OUTPATIENT
Start: 2025-06-03 | End: 2025-06-03

## 2025-06-03 RX ORDER — LIDOCAINE IN NACL,ISO-OSMOT/PF 30 MG/3 ML
0.1 SYRINGE (ML) INJECTION ONCE
Status: DISCONTINUED | OUTPATIENT
Start: 2025-06-03 | End: 2025-06-04 | Stop reason: HOSPADM

## 2025-06-03 RX ORDER — OXYCODONE HYDROCHLORIDE 5 MG/1
5 TABLET ORAL EVERY 4 HOURS PRN
Status: DISCONTINUED | OUTPATIENT
Start: 2025-06-03 | End: 2025-06-04 | Stop reason: HOSPADM

## 2025-06-03 RX ORDER — HYDROMORPHONE HYDROCHLORIDE 1 MG/ML
0.2 INJECTION, SOLUTION INTRAMUSCULAR; INTRAVENOUS; SUBCUTANEOUS EVERY 5 MIN PRN
Status: DISCONTINUED | OUTPATIENT
Start: 2025-06-03 | End: 2025-06-04 | Stop reason: HOSPADM

## 2025-06-03 RX ORDER — HYDROMORPHONE HYDROCHLORIDE 1 MG/ML
0.5 INJECTION, SOLUTION INTRAMUSCULAR; INTRAVENOUS; SUBCUTANEOUS EVERY 5 MIN PRN
Status: DISCONTINUED | OUTPATIENT
Start: 2025-06-03 | End: 2025-06-04 | Stop reason: HOSPADM

## 2025-06-03 RX ORDER — ONDANSETRON HYDROCHLORIDE 2 MG/ML
INJECTION, SOLUTION INTRAVENOUS AS NEEDED
Status: DISCONTINUED | OUTPATIENT
Start: 2025-06-03 | End: 2025-06-03

## 2025-06-03 RX ORDER — ROCURONIUM BROMIDE 10 MG/ML
INJECTION, SOLUTION INTRAVENOUS AS NEEDED
Status: DISCONTINUED | OUTPATIENT
Start: 2025-06-03 | End: 2025-06-03

## 2025-06-03 RX ORDER — PHENYLEPHRINE HCL IN 0.9% NACL 0.4MG/10ML
SYRINGE (ML) INTRAVENOUS AS NEEDED
Status: DISCONTINUED | OUTPATIENT
Start: 2025-06-03 | End: 2025-06-03

## 2025-06-03 RX ORDER — MIDAZOLAM HYDROCHLORIDE 1 MG/ML
INJECTION INTRAMUSCULAR; INTRAVENOUS AS NEEDED
Status: DISCONTINUED | OUTPATIENT
Start: 2025-06-03 | End: 2025-06-03

## 2025-06-03 RX ORDER — ONDANSETRON HYDROCHLORIDE 2 MG/ML
4 INJECTION, SOLUTION INTRAVENOUS ONCE AS NEEDED
Status: DISCONTINUED | OUTPATIENT
Start: 2025-06-03 | End: 2025-06-04 | Stop reason: HOSPADM

## 2025-06-03 RX ORDER — ERTAPENEM 1 G/1
INJECTION, POWDER, LYOPHILIZED, FOR SOLUTION INTRAMUSCULAR; INTRAVENOUS AS NEEDED
Status: DISCONTINUED | OUTPATIENT
Start: 2025-06-03 | End: 2025-06-03

## 2025-06-03 RX ADMIN — ROCURONIUM BROMIDE 50 MG: 10 INJECTION INTRAVENOUS at 09:28

## 2025-06-03 RX ADMIN — MIDAZOLAM HYDROCHLORIDE 2 MG: 2 INJECTION, SOLUTION INTRAMUSCULAR; INTRAVENOUS at 09:28

## 2025-06-03 RX ADMIN — SUGAMMADEX 200 MG: 100 INJECTION, SOLUTION INTRAVENOUS at 10:43

## 2025-06-03 RX ADMIN — PROPOFOL 200 MG: 10 INJECTION, EMULSION INTRAVENOUS at 09:28

## 2025-06-03 RX ADMIN — Medication 200 MCG: at 10:19

## 2025-06-03 RX ADMIN — LIDOCAINE HYDROCHLORIDE 100 MG: 20 INJECTION, SOLUTION INFILTRATION; PERINEURAL at 09:28

## 2025-06-03 RX ADMIN — ERTAPENEM SODIUM 1 G: 1 INJECTION, POWDER, LYOPHILIZED, FOR SOLUTION INTRAMUSCULAR; INTRAVENOUS at 10:17

## 2025-06-03 RX ADMIN — SODIUM CHLORIDE, SODIUM GLUCONATE, SODIUM ACETATE, POTASSIUM CHLORIDE AND MAGNESIUM CHLORIDE: 526; 502; 368; 37; 30 INJECTION, SOLUTION INTRAVENOUS at 09:28

## 2025-06-03 RX ADMIN — Medication 120 MCG: at 10:15

## 2025-06-03 RX ADMIN — ONDANSETRON 4 MG: 2 INJECTION, SOLUTION INTRAMUSCULAR; INTRAVENOUS at 10:36

## 2025-06-03 SDOH — HEALTH STABILITY: MENTAL HEALTH: CURRENT SMOKER: 0

## 2025-06-03 ASSESSMENT — PAIN SCALES - GENERAL
PAINLEVEL_OUTOF10: 0 - NO PAIN
PAIN_LEVEL: 4

## 2025-06-03 ASSESSMENT — COLUMBIA-SUICIDE SEVERITY RATING SCALE - C-SSRS
1. IN THE PAST MONTH, HAVE YOU WISHED YOU WERE DEAD OR WISHED YOU COULD GO TO SLEEP AND NOT WAKE UP?: NO
2. HAVE YOU ACTUALLY HAD ANY THOUGHTS OF KILLING YOURSELF?: NO
6. HAVE YOU EVER DONE ANYTHING, STARTED TO DO ANYTHING, OR PREPARED TO DO ANYTHING TO END YOUR LIFE?: NO

## 2025-06-03 ASSESSMENT — PAIN - FUNCTIONAL ASSESSMENT
PAIN_FUNCTIONAL_ASSESSMENT: 0-10
PAIN_FUNCTIONAL_ASSESSMENT: 0-10

## 2025-06-03 NOTE — ANESTHESIA PREPROCEDURE EVALUATION
"Patient: Ladarius Monsalve \"Ellis\"    Procedure Information       Date/Time: 06/03/25 0910    Scheduled providers: Stefan Fraser MD; Lorna Aguila RN    Procedure: ERCP    Location: [facility]            Relevant Problems   GI   (+) GI bleed   (+) Rectal bleeding      Liver   (+) Liver fibrosis      HEENT   (+) Sinusitis, acute       Clinical information reviewed:   Tobacco  Allergies    Med Hx  Surg Hx   Fam Hx  Soc Hx        NPO Detail:  NPO/Void Status  Date of Last Liquid: 06/02/25  Time of Last Liquid: 2330  Date of Last Solid: 06/02/25         Physical Exam    Airway  Mallampati: I  TM distance: >3 FB  Neck ROM: full     Cardiovascular - normal exam   Dental - normal exam     Pulmonary - normal exam   Abdominal - normal exam           Anesthesia Plan    History of general anesthesia?: yes  History of complications of general anesthesia?: no    ASA 2     general     The patient is not a current smoker.  Patient was not previously instructed to abstain from smoking on day of procedure.  Patient did not smoke on day of procedure.    intravenous induction   Postoperative pain plan includes opioids.  Anesthetic plan and risks discussed with patient.  Use of blood products discussed with patient who.      "

## 2025-06-03 NOTE — ANESTHESIA PROCEDURE NOTES
Airway  Date/Time: 6/3/2025 9:30 AM  Reason: elective    Airway not difficult    Staffing  Performed: CRNA   Authorized by: Eulalio Pina MD    Performed by: LAINA Donahue-LISY  Patient location during procedure: OR    Patient Condition  Indications for airway management: anesthesia  Patient position: sniffing  MILS maintained throughout  Planned trial extubation  Sedation level: deep     Final Airway Details   Preoxygenated: yes  Final airway type: endotracheal airway  Successful airway: ETT  Cuffed: yes   Successful intubation technique: video laryngoscopy  Endotracheal tube insertion site: oral  Blade: Carin  Blade size: #4  ETT size (mm): 7.5  Cormack-Lehane Classification: grade I - full view of glottis  Placement verified by: chest auscultation and capnometry   Measured from: lips  ETT to lips (cm): 21  Number of attempts at approach: 1        
None

## 2025-06-03 NOTE — H&P
"Subjective     History of Present Illness:   Ladarius Monsalve \"Ellis\" is a 35 y.o. male who presents to endoscopy    Physical Exam  General: not in acute distress  CV: regular rate and rhythm  Resp: non-labored breathing   "

## 2025-06-03 NOTE — ANESTHESIA POSTPROCEDURE EVALUATION
"Patient: Ladarius Monsalve \"Ellis\"    Procedure Summary       Date: 06/03/25 Room / Location: [facility]    Anesthesia Start: 0922 Anesthesia Stop: 1056    Procedure: ERCP Diagnosis:       PSC (primary sclerosing cholangitis)      Jaundice      Elevated liver enzymes    Scheduled Providers: Stefan Fraser MD; Lorna Aguila RN Responsible Provider: Eulalio Pina MD    Anesthesia Type: general ASA Status: 2            Anesthesia Type: general    Vitals Value Taken Time   /77 06/03/25 10:55   Temp 36.2 °C (97.2 °F) 06/03/25 10:55   Pulse 67 06/03/25 10:55   Resp 13 06/03/25 10:55   SpO2 100 % 06/03/25 10:55       Anesthesia Post Evaluation    Patient location during evaluation: PACU  Patient participation: complete - patient participated  Level of consciousness: awake and alert  Pain score: 4  Pain management: adequate  Airway patency: patent  Cardiovascular status: acceptable  Respiratory status: acceptable  Hydration status: acceptable  Postoperative Nausea and Vomiting: none        No notable events documented.    "

## 2025-06-04 ENCOUNTER — APPOINTMENT (OUTPATIENT)
Dept: GASTROENTEROLOGY | Facility: HOSPITAL | Age: 35
End: 2025-06-04
Payer: COMMERCIAL

## 2025-06-04 ASSESSMENT — PAIN SCALES - GENERAL: PAINLEVEL_OUTOF10: 0 - NO PAIN

## 2025-06-05 LAB
LABORATORY COMMENT REPORT: NORMAL
LABORATORY COMMENT REPORT: NORMAL
PATH REPORT.FINAL DX SPEC: NORMAL
PATH REPORT.GROSS SPEC: NORMAL
PATH REPORT.TOTAL CANCER: NORMAL

## 2025-06-25 ENCOUNTER — APPOINTMENT (OUTPATIENT)
Dept: GASTROENTEROLOGY | Facility: HOSPITAL | Age: 35
End: 2025-06-25
Payer: COMMERCIAL

## 2025-07-01 DIAGNOSIS — K83.01 PSC (PRIMARY SCLEROSING CHOLANGITIS): ICD-10-CM

## 2025-07-01 DIAGNOSIS — K74.00 LIVER FIBROSIS: ICD-10-CM

## 2025-07-01 DIAGNOSIS — R74.8 ELEVATED LIVER ENZYMES: ICD-10-CM

## 2025-07-02 LAB
AFP-TM SERPL-MCNC: 3.8 NG/ML
ALBUMIN SERPL-MCNC: 4.2 G/DL (ref 3.6–5.1)
ALBUMIN/GLOB SERPL: 1.7 (CALC) (ref 1–2.5)
ALP SERPL-CCNC: 584 U/L (ref 36–130)
ALT SERPL-CCNC: 302 U/L (ref 9–46)
ANION GAP SERPL CALCULATED.4IONS-SCNC: 10 MMOL/L (CALC) (ref 7–17)
AST SERPL-CCNC: 187 U/L (ref 10–40)
BASOPHILS # BLD AUTO: 20 CELLS/UL (ref 0–200)
BASOPHILS NFR BLD AUTO: 1.3 %
BILIRUB DIRECT SERPL-MCNC: 1.4 MG/DL
BILIRUB INDIRECT SERPL-MCNC: 1.5 MG/DL (CALC) (ref 0.2–1.2)
BILIRUB SERPL-MCNC: 2.9 MG/DL (ref 0.2–1.2)
BUN SERPL-MCNC: 20 MG/DL (ref 7–25)
BUN/CREAT SERPL: NORMAL (CALC) (ref 6–22)
CALCIUM SERPL-MCNC: 9.2 MG/DL (ref 8.6–10.3)
CANCER AG19-9 SERPL-ACNC: <3 U/ML
CHLORIDE SERPL-SCNC: 106 MMOL/L (ref 98–110)
CO2 SERPL-SCNC: 26 MMOL/L (ref 20–32)
CREAT SERPL-MCNC: 0.64 MG/DL (ref 0.6–1.26)
EGFRCR SERPLBLD CKD-EPI 2021: 127 ML/MIN/1.73M2
EOSINOPHIL # BLD AUTO: 68 CELLS/UL (ref 15–500)
EOSINOPHIL NFR BLD AUTO: 4.5 %
ERYTHROCYTE [DISTWIDTH] IN BLOOD BY AUTOMATED COUNT: 18.1 % (ref 11–15)
GLOBULIN SER CALC-MCNC: 2.5 G/DL (CALC) (ref 1.9–3.7)
GLUCOSE SERPL-MCNC: 104 MG/DL (ref 65–139)
HCT VFR BLD AUTO: 37.8 % (ref 38.5–50)
HGB BLD-MCNC: 11.9 G/DL (ref 13.2–17.1)
INR PPP: 1
LYMPHOCYTES # BLD AUTO: 594 CELLS/UL (ref 850–3900)
LYMPHOCYTES NFR BLD AUTO: 39.6 %
MCH RBC QN AUTO: 28.1 PG (ref 27–33)
MCHC RBC AUTO-ENTMCNC: 31.5 G/DL (ref 32–36)
MCV RBC AUTO: 89.4 FL (ref 80–100)
MONOCYTES # BLD AUTO: 137 CELLS/UL (ref 200–950)
MONOCYTES NFR BLD AUTO: 9.1 %
NEUTROPHILS # BLD AUTO: 683 CELLS/UL (ref 1500–7800)
NEUTROPHILS NFR BLD AUTO: 45.5 %
PLATELET # BLD AUTO: 52 THOUSAND/UL (ref 140–400)
PMV BLD REES-ECKER: 12.1 FL (ref 7.5–12.5)
POTASSIUM SERPL-SCNC: 3.8 MMOL/L (ref 3.5–5.3)
PROT SERPL-MCNC: 6.7 G/DL (ref 6.1–8.1)
PROTHROMBIN TIME: 11.1 SEC (ref 9–11.5)
RBC # BLD AUTO: 4.23 MILLION/UL (ref 4.2–5.8)
SERVICE CMNT-IMP: ABNORMAL
SODIUM SERPL-SCNC: 142 MMOL/L (ref 135–146)
WBC # BLD AUTO: 1.5 THOUSAND/UL (ref 3.8–10.8)

## 2025-07-05 NOTE — PROGRESS NOTES
Hepatology: Follow up Office Note     Patient: Ladarius Monsalve, a 35 y.o. year old male presents for follow up of PSC.   Used to follow up with Dr Masterson in hepatology- last seen in April 2022.   PCP: Leeanna Cannon MD    History of Present Illness   Ladarius Monsalve presents for a follow up visit today. He transferred care to my office in June 2024 for hx of PSC. Used to follow up with Dr Masterson in hepatology- last seen in April 2022.     Diagnosed with PSC: 7-8 years ago.  Notes that he was diagnosed with PSC around the time of undergoing surgery for ulcerative colitis.  Recall some concern around infections which might be affecting the liver/bile duct in the postsurgical period in 2016.  This was around the time when he had the ERCPs.  Since then he denies having any complications from his liver disease are known to him.   Per patient's understanding of his disease process for the liver, he has not been aware of any concerns of advanced fibrosis or cirrhosis.  His last hepatology follow-up was in early 2022.  In December 2023 around the holidays, he had some concerns about becoming jaundiced but seem to self resolve around the new year time. On lomotil and Questran.        History of complications of liver disease:   Cholangitis: pt called the office in late Oct 2024 with symptoms of RUQ abdominal pain- prescribed cipro.   Stricture: required stenting and dilation of mid CBD stricture and dilation of right hepatic duct stricture in 2016.   Worsening bilirubin: ERCP June 2025 with diffuse stricturing ds, balloon dilation of left main and right main hepatic duct. Brushing of left main hepatic duct negative.   No known hx of malignant stricture.   No known hx of ascites, hepatic encephalopathy or portal HTN that are known.      Today's visit: Here for follow-up. Notes feeling well. Mild symptoms of intermittent itching. Denies having fever, abdominal pain, nausea, vomiting.   He denies chills, symptoms of  infection. Had an ERCP done in June 2025, no issues thereafter.     Review of Systems   Constitutional/ General: No fever   Eyes: mild yellow discoloration of eyes (improved from before)  ENT: normal   Cardiovascular: no chest pain, no palpitations  Respiratory: no shortness of breath  Gastrointestinal: denies abdominal pain, nausea, vomiting  Integumentary: notes dry skin, no yellow discoloration of skin  Neurologic: no weakness or numbness of extremities  Psychiatric: no mood fluctuations  Musculoskeletal: Notes having some joint stiffness in wrist and finger joints, last for a few minutes  Genitourinary: no dysuria, no hematuria    All other systems have been reviewed and are negative except as noted in the HPI and above.    PMHx: ulcerative colitis s/p total colectomy with proctectomy- ileostomy, ADD, high output ileostomy.  PSHx: s/p total colectomy with proctectomy- ileostomy 2016, 2017- IPAA  Social hx: alcohol use present (occasional) 1-2 drinks once a month (12 oz) beer; maybe once- twice a year 3-4 drinks in one setting, denies hx of smoking, denies hx of illicit substance use.   Family hx: Maternal grandfather with possibly stomach or pancreatic malignancy.      Medications     Current Outpatient Medications   Medication Instructions    cholestyramine (Questran) 4 gram powder MIX 1 SCOOP OF POWDER WITH LIQUID AND TAKE BY MOUTH THREE TIMES DAILY AS DIRECTED    diphenoxylate-atropine (Lomotil) 2.5-0.025 mg tablet 2 tablets, oral, 4 times daily PRN    loperamide (Imodium) 2 mg capsule TAKE 3 CAPSULES BY MOUTH BEFORE MEALS AND AT BEDTIME AS NEEDED    mesalamine (Canasa) 1,000 mg suppository Insert into the rectum.    psyllium husk, aspartame, (Metamucil Sugar-Free, aspart,) 3.4 gram/5.8 gram powder Take by mouth.    serdexmethylphen-dexmethylphen (Azstarys) 52.3 mg- 10.4 mg capsule 1 capsule, oral, Daily    serdexmethylphen-dexmethylphen (Azstarys) 52.3 mg- 10.4 mg capsule 1 capsule, oral, Every morning     serdexmethylphen-dexmethylphen 52.3 mg- 10.4 mg capsule 1 capsule, oral, Daily    tadalafil (CIALIS) 5 mg, oral, Daily    tadalafil 10 mg, oral, Every 48 hours PRN    tamsulosin (FLOMAX) 0.4 mg, oral, Daily      Physical Examination     Vitals:    07/08/25 1149   BP: 118/72   Pulse: 79   Resp: 20   Temp: 36.3 °C (97.4 °F)   SpO2: 98%     Vitals:    07/08/25 1149   Weight: 77.6 kg (171 lb)     Body mass index is 23.19 kg/m².    Constitutional: awake, alert   Eyes: EOMI, slightly icteric sclera   Respiratory: Bilateral air entry equal    Cardiovascular: regular rate and rhythm, no lower extremity edema  Abdomen: soft, non tender, non distended, no free fluid wave appreciated, bowel sounds present  Neurologic: gross motor strength intact   Psychiatric: alert, appropriate mood and affect, oriented to time/place/person    Labs     Lab Results   Component Value Date     07/01/2025    K 3.8 07/01/2025    CREATININE 0.64 07/01/2025    BILITOT 2.9 (H) 07/01/2025    ALBUMIN 4.2 07/01/2025     (H) 07/01/2025     (H) 07/01/2025    ALKPHOS 584 (H) 07/01/2025    INR 1.0 07/01/2025    AFP 3.8 07/01/2025    HGB 11.9 (L) 07/01/2025    PLT 52 (L) 07/01/2025     <3 07/01/2025      MELD 3.0: 11 at 7/1/2025  4:06 PM  MELD-Na: 10 at 7/1/2025  4:06 PM  Calculated from:  Serum Creatinine: 0.64 mg/dL (Using min of 1 mg/dL) at 7/1/2025  4:06 PM  Serum Sodium: 142 mmol/L (Using max of 137 mmol/L) at 7/1/2025  4:06 PM  Total Bilirubin: 2.9 mg/dL at 7/1/2025  4:06 PM  Serum Albumin: 4.2 g/dL (Using max of 3.5 g/dL) at 7/1/2025  4:06 PM  INR(ratio): 1 at 7/1/2025  4:06 PM  Age at listing (hypothetical): 35 years  Sex: Male at 7/1/2025  4:06 PM    Lab Results   Component Value Date     (H) 07/01/2025     (H) 05/21/2025     (H) 05/13/2025     (H) 04/30/2025     (H) 03/28/2025     (H) 07/01/2025     (H) 05/21/2025     (H) 05/13/2025     (H) 04/30/2025      (H) 03/28/2025    ALKPHOS 584 (H) 07/01/2025    ALKPHOS 645 (H) 05/21/2025    ALKPHOS 554 (H) 05/13/2025    ALKPHOS 512 (H) 04/30/2025    ALKPHOS 574 (H) 03/28/2025    BILITOT 2.9 (H) 07/01/2025    BILITOT 2.8 (H) 05/21/2025    BILITOT 3.9 (H) 05/13/2025    BILITOT 2.3 (H) 04/30/2025    BILITOT 5.6 (H) 03/28/2025     <3 07/01/2025    AFP 3.8 07/01/2025    PLT 52 (L) 07/01/2025    PLT 62 (L) 05/21/2025    PLT 61 (L) 05/13/2025    WBC 1.5 (L) 07/01/2025    WBC 2.1 (L) 05/21/2025    WBC 2.1 (L) 05/13/2025    HGB 11.9 (L) 07/01/2025    HGB 12.6 (L) 05/21/2025    HGB 12.2 (L) 05/13/2025     Lab Results   Component Value Date    HEPBSAG Nonreactive 06/06/2024    HEPBSAB 86.2 (H) 06/06/2024    HEPBCAB Nonreactive 06/06/2024    HEPCAB Nonreactive 06/06/2024    HEPATOT Reactive (A) 06/06/2024    TONY Negative 06/06/2024    ASMAB Negative 06/06/2024    MITOAB Negative 06/06/2024    IGG 1,270 06/06/2024    IGG 1,270 06/06/2024     06/06/2024     06/06/2024      Imaging   MRI abdomen MRCP March 2025:   IMPRESSION:  1.  Similar appearance of the liver compared to 06/24/2024 with right-sided intrahepatic biliary ductal dilation and beaded appearance of the dilated biliary ducts, compatible with patient's history of primary sclerosing cholangitis.  2. No new focal arterially hyperenhancing lesion or liver mass.  3. Further interval increase in splenomegaly.  4. Redemonstration of a dilated splenic, main, right and left portal vein.  5. Minimal perihepatic and perisplenic free fluid.    MRI MRE liver June 2024: IMPRESSION:  1. Overall stable appearance of the liver compared to 10/21/2021 including right-sided intrahepatic biliary ductal dilatation with  beaded appearance of the dilated biliary ducts, compatible with history of primary sclerosing cholangitis. No new hepatic abnormality  is evident.  2. Partially contracted gallbladder with pericholecystic fluid appear similar to prior.  3. Interval increased  splenomegaly.  4. MR elastography results compatible with stage 1-2, borderline stage 2-3 fibrosis as detailed above.    MRI liver Oct 2021: IMPRESSION: Intrahepatic biliary dilation throughout the right hepatic lobe with irregular beaded appearance of the dilated ductal segments compatible with the history of primary sclerosing cholangitis. Common bile duct is not dilated.  Partially contracted gallbladder with nonspecific diffuse gallbladder wall thickening as well as small volume pericholecystic fluid/edema.    US Oct 2016:   1.  Findings suggesting strictures involving the proximal and distal common bile duct with associated intra and extrahepatic biliary dilatation.  Additionally, dependent echogenic material within the common duct could represent sludge or noncalcified stones.  No gross masses are identified.  Further evaluation with MRCP and MRI of the liver with contrast is recommended.  2.  Splenomegaly.  3.  Enlarged lymph nodes within the radha hepatis may be reactive in etiology.    April 2017: Findings: Patient is status-post total colectomy with an ileal pouch-anal anastomosis. The exam was otherwise without abnormality.    ERCP June 2025: Impression  Sphincterotomy was performed.  Localized, intrinsic and smooth stricture was visualized in the left main hepatic duct, right main hepatic duct, left intrahepatic ducts and right intrahepatic ducts  Performed balloon dilation in the left main hepatic duct and right main hepatic duct.. The dilator was expanded to 4 mm ending size. Dilation caused improved lumen appearance.  Performed brushings in the left main hepatic duct  Final Cytological Interpretation      A. BILE DUCT BRUSH STRICTURE                  No malignant cells identified                Epithelial cells with reactive atypia present.     : Dr. Faye Walker     ERCP 2016 Dec: - One partially occluded stent from the biliary tree was seen in the major papilla.                          - Prior biliary endoscopic sphincterotomy appeared open.                         - One stent was removed from the biliary tree.                         - Improved CBD stricture s/p further dilation, and a stricture in the Rt main duct s/p balloon dilation.    ERCP 2016 Nov:  - Diffuse biliary strictures were found secondary to primary sclerosing cholangitis with a dominant stricture in the mid common bile duct at the take off of the cystic duct. Cytology was obtained.                         - One temporary stent was placed into the common bile duct.  Cytology: A.  COMMON BILE DUCT BRUSH: ATYPICAL CELLS ARE PRESENT, PROBABLY RELATED TO REACTIVE/REPARATIVE PROCESS.     Pouchoscopy [id number].  Pouchoscopy Nov 2016: The ileoanal pouch and tramaine-terminal ileum are normal. No specimens collected.    Assessment and Plan    Ladarius Monsalve, a 35 y.o. year old male presents for follow up of PSC. I have reviewed pertinent provider notes, labs and imaging studies. Discussed results and their interpretation with the patient today.    Encounter Diagnoses   Name Primary?    PSC (primary sclerosing cholangitis) Yes    Elevated liver enzymes     Pancytopenia     Liver fibrosis      Orders Placed This Encounter   Procedures    CBC and Auto Differential    CBC and Auto Differential    Hepatic Function Panel    Basic Metabolic Panel    Protime-INR    Referral To Hematology and Oncology      # Large duct PSC: Diagnosis based on prior imaging- both MRCP and ERCP  # Hx of elevated liver enzymes: cholestatic pattern of liver enzyme elevation  # Concern for portal HTN based on imaging  # Pancytopenia    MRI elastography of the liver June 2024 estimated fibrosis staging up to 2-3.   Suspect he has advanced ds with portal HTN, given splenomegaly, thrombocytopenia, and dilated portal vein.   ERCPs in 2016, at which point a dominant stricture in the CBD was noted as well as a stricture in the right main duct.  Both of these have been  intervened on with balloon dilation, stent placement and he also has a sphincterotomy in place.  In early 2025 he had worsening hyperbilirubinemia on labs.  He underwent an MRCP and an ERCP. No dominant stricture. S/p balloon dilation of the right and left main hepatic ducts.   These findings are suggestive of disease progression.       Have reviewed with patient in the past pathophysiology and natural history of primary sclerosing cholangitis with the patient.  Discussed the risk of progression of disease resulting in worsening stricturing, obstructive symptoms, risk of cholangitis, risk of cholangiocarcinoma, cirrhosis, risk of hepatocellular carcinoma.  In addition discussed risk of other malignancies including gallbladder cancer.  Again reviewed with him today that close monitoring will continue.  He is aware that in case of development of any overt complications and or continue progression of his liver disease that we may be looking at potential need for transplant evaluation in the future.    -Recommend CBC check in 2 weeks. No acute symptoms of infection.   - In addition recommend hematology evaluation given worsening leukopenia, development of mild anemia.   - Recommend labs to check LFTs, BMP, CBC, INR q3 months.  Recommend check of tumor markers- CA 19-9, AFP every 6 months.   - Recommend MRCP MRI in the next year. Will consider MR elastography in the next 1-2 years for disease/fibrosis assessment.   - He has abstained from all alcohol use since his initial visit.  Congratulated patient on doing so and reinforced this goal.    - Recommend avoiding hepatotoxic agents- including NSAIDs and herbal supplements.   - HR and BP stable in today's visit- recommend initiation of carvedilol 3.125mg BID. Will titrate up in next visit based on hemodynamics.     -Patient was again educated about red flag symptoms of right upper quadrant abdominal pain, fevers, chills, worsening jaundice.  In case of development of such  symptoms he can reach out to my office and/or in case of significant symptoms present to the emergency room.    Follow up visit in 4 months.

## 2025-07-07 NOTE — PROGRESS NOTES
Subjective   Ellis Monsalve is a 35 y.o.   male.  HPI  The patient is being seen today for their encephalopathy r/t their ADHD. They are currently taking Azstarys and it has been effective. The patient can tell when the medication wears off. The patient agrees that their quality of life has improved while taking this medication. Patient denies any chest pain, heart palpitations, sleep issues, appetite changes, weight loss, and mood changes while taking this medication. I have reviewed the medications and the chart. Review of systems are negative unless otherwise specified in HPI.    Objective   Neurological Exam  Mental Status  Awake, alert and oriented to person, place and time. Speech is normal. Language is fluent with no aphasia. Attention and concentration are normal.    Cranial Nerves  CN III, IV, VI: Pupils equal round and reactive to light bilaterally.  And EOM's Normal.    Motor   Strength is 5/5 throughout all four extremities.    Sensory  Light touch is normal in upper and lower extremities.     Reflexes  Deep tendon reflexes are 2+ and symmetric in all four extremities.    Gait  Casual gait is normal including stance, stride, and arm swing.Normal toe walking. Normal heel walking.    Physical Exam  Eyes:      Pupils: Pupils are equal, round, and reactive to light.   Neurological:      Motor: Motor strength is normal.     Deep Tendon Reflexes: Reflexes are normal and symmetric.   Psychiatric:         Speech: Speech normal.         Assessment/Plan   #Encephalopathy in the setting of ADHD    Discussed following up in 3 months. Medication was sent to pharmacy. Discussed with the patient the purpose of medication, as well as potential side effects to be aware of. Informed the patient about abuse potential of medication and the importance adhering to the controlled substance agreement. Advised patient to call office with any adverse reaction to medication.

## 2025-07-08 ENCOUNTER — OFFICE VISIT (OUTPATIENT)
Dept: GASTROENTEROLOGY | Facility: CLINIC | Age: 35
End: 2025-07-08
Payer: COMMERCIAL

## 2025-07-08 VITALS
OXYGEN SATURATION: 98 % | TEMPERATURE: 97.4 F | HEART RATE: 79 BPM | SYSTOLIC BLOOD PRESSURE: 118 MMHG | RESPIRATION RATE: 20 BRPM | DIASTOLIC BLOOD PRESSURE: 72 MMHG | WEIGHT: 171 LBS | BODY MASS INDEX: 23.19 KG/M2

## 2025-07-08 DIAGNOSIS — R74.8 ELEVATED LIVER ENZYMES: ICD-10-CM

## 2025-07-08 DIAGNOSIS — K74.00 LIVER FIBROSIS: ICD-10-CM

## 2025-07-08 DIAGNOSIS — D61.818 PANCYTOPENIA: ICD-10-CM

## 2025-07-08 DIAGNOSIS — K83.01 PSC (PRIMARY SCLEROSING CHOLANGITIS): Primary | ICD-10-CM

## 2025-07-08 PROCEDURE — 99214 OFFICE O/P EST MOD 30 MIN: CPT | Performed by: STUDENT IN AN ORGANIZED HEALTH CARE EDUCATION/TRAINING PROGRAM

## 2025-07-08 PROCEDURE — 1036F TOBACCO NON-USER: CPT | Performed by: STUDENT IN AN ORGANIZED HEALTH CARE EDUCATION/TRAINING PROGRAM

## 2025-07-08 PROCEDURE — 99212 OFFICE O/P EST SF 10 MIN: CPT | Performed by: STUDENT IN AN ORGANIZED HEALTH CARE EDUCATION/TRAINING PROGRAM

## 2025-07-08 RX ORDER — CARVEDILOL 3.12 MG/1
3.12 TABLET ORAL 2 TIMES DAILY
Qty: 60 TABLET | Refills: 3 | Status: SHIPPED | OUTPATIENT
Start: 2025-07-08 | End: 2025-11-05

## 2025-07-08 ASSESSMENT — PAIN SCALES - GENERAL: PAINLEVEL_OUTOF10: 0-NO PAIN

## 2025-07-08 NOTE — PATIENT INSTRUCTIONS
Ladarius Monsalve,     Thank you for coming in for your hepatology office visit today.   As per our discussion, I recommend:     Have blood work done in 2 weeks for repeat CBC check for your cell counts.   Blood work for full check of the liver will be in 3 months.   Schedule a visit to see a hematologist for assessment of the low white cell, platelet and hemoglobin levels.    If you develop any fever, chills, abdominal pain, nausea, vomiting, please call the office.  Start carvedilol 3.125mg twice a day. If you have any dizziness, lightheadedness, fatigue- hold the med and call my office.     I would like to see you back in the office in 3-4 months.   If you are not provided with a date for your follow up visit at the end of your encounter today at the check out desk, I would encourage you to call [phone] to schedule your appointment with me.   If you have any trouble or need assistance with having this done, please reach out to my office staff at [phone] or my nurse coordinator at [phone] (Ms Ondina GALICIA).     I look forward to seeing you again. Thank you for allowing me to participate in your care.     Sincerely,  Lisa Crenshaw MD  Hepatology

## 2025-07-10 ENCOUNTER — APPOINTMENT (OUTPATIENT)
Dept: NEUROLOGY | Facility: CLINIC | Age: 35
End: 2025-07-10
Payer: COMMERCIAL

## 2025-07-10 VITALS
BODY MASS INDEX: 23.16 KG/M2 | HEIGHT: 72 IN | WEIGHT: 171 LBS | DIASTOLIC BLOOD PRESSURE: 79 MMHG | SYSTOLIC BLOOD PRESSURE: 125 MMHG | HEART RATE: 89 BPM

## 2025-07-10 DIAGNOSIS — G93.40 ENCEPHALOPATHY, UNSPECIFIED TYPE: Primary | ICD-10-CM

## 2025-07-10 DIAGNOSIS — F90.0 ATTENTION DEFICIT HYPERACTIVITY DISORDER (ADHD), PREDOMINANTLY INATTENTIVE TYPE: ICD-10-CM

## 2025-07-10 PROCEDURE — 1036F TOBACCO NON-USER: CPT

## 2025-07-10 PROCEDURE — 3008F BODY MASS INDEX DOCD: CPT

## 2025-07-10 PROCEDURE — 99214 OFFICE O/P EST MOD 30 MIN: CPT

## 2025-07-10 RX ORDER — SERDEXMETHYLPHENIDATE AND DEXMETHYLPHENIDATE 10.4; 52.3 MG/1; MG/1
1 CAPSULE ORAL DAILY
Qty: 30 CAPSULE | Refills: 0 | Status: SHIPPED | OUTPATIENT
Start: 2025-07-10

## 2025-07-10 RX ORDER — SERDEXMETHYLPHENIDATE AND DEXMETHYLPHENIDATE 10.4; 52.3 MG/1; MG/1
1 CAPSULE ORAL EVERY MORNING
Qty: 30 CAPSULE | Refills: 0 | Status: SHIPPED | OUTPATIENT
Start: 2025-08-09

## 2025-07-22 DIAGNOSIS — R74.8 ELEVATED LIVER ENZYMES: ICD-10-CM

## 2025-07-22 DIAGNOSIS — K83.01 PSC (PRIMARY SCLEROSING CHOLANGITIS): ICD-10-CM

## 2025-07-31 LAB
BASOPHILS # BLD AUTO: 10 CELLS/UL (ref 0–200)
BASOPHILS NFR BLD AUTO: 0.5 %
EOSINOPHIL # BLD AUTO: 118 CELLS/UL (ref 15–500)
EOSINOPHIL NFR BLD AUTO: 6.2 %
ERYTHROCYTE [DISTWIDTH] IN BLOOD BY AUTOMATED COUNT: 16.9 % (ref 11–15)
HCT VFR BLD AUTO: 38 % (ref 38.5–50)
HGB BLD-MCNC: 12.2 G/DL (ref 13.2–17.1)
LYMPHOCYTES # BLD AUTO: 705 CELLS/UL (ref 850–3900)
LYMPHOCYTES NFR BLD AUTO: 37.1 %
MCH RBC QN AUTO: 28.7 PG (ref 27–33)
MCHC RBC AUTO-ENTMCNC: 32.1 G/DL (ref 32–36)
MCV RBC AUTO: 89.4 FL (ref 80–100)
MONOCYTES # BLD AUTO: 137 CELLS/UL (ref 200–950)
MONOCYTES NFR BLD AUTO: 7.2 %
NEUTROPHILS # BLD AUTO: 931 CELLS/UL (ref 1500–7800)
NEUTROPHILS NFR BLD AUTO: 49 %
PLATELET # BLD AUTO: 53 THOUSAND/UL (ref 140–400)
PMV BLD REES-ECKER: 11.3 FL (ref 7.5–12.5)
RBC # BLD AUTO: 4.25 MILLION/UL (ref 4.2–5.8)
SERVICE CMNT-IMP: ABNORMAL
WBC # BLD AUTO: 1.9 THOUSAND/UL (ref 3.8–10.8)

## 2025-09-04 ENCOUNTER — OFFICE VISIT (OUTPATIENT)
Dept: SURGERY | Facility: CLINIC | Age: 35
End: 2025-09-04
Payer: COMMERCIAL

## 2025-09-04 VITALS
HEIGHT: 72 IN | HEART RATE: 66 BPM | TEMPERATURE: 97.9 F | BODY MASS INDEX: 22.29 KG/M2 | OXYGEN SATURATION: 98 % | SYSTOLIC BLOOD PRESSURE: 123 MMHG | RESPIRATION RATE: 16 BRPM | DIASTOLIC BLOOD PRESSURE: 69 MMHG | WEIGHT: 164.6 LBS

## 2025-09-04 DIAGNOSIS — R19.7 DIARRHEA, UNSPECIFIED TYPE: ICD-10-CM

## 2025-09-04 PROCEDURE — 99213 OFFICE O/P EST LOW 20 MIN: CPT | Performed by: NURSE PRACTITIONER

## 2025-09-04 PROCEDURE — 3008F BODY MASS INDEX DOCD: CPT | Performed by: NURSE PRACTITIONER

## 2025-09-04 PROCEDURE — 1036F TOBACCO NON-USER: CPT | Performed by: NURSE PRACTITIONER

## 2025-09-04 RX ORDER — DIPHENOXYLATE HYDROCHLORIDE AND ATROPINE SULFATE 2.5; .025 MG/1; MG/1
2 TABLET ORAL 4 TIMES DAILY
Qty: 240 TABLET | Refills: 5 | Status: SHIPPED | OUTPATIENT
Start: 2025-09-04

## 2025-09-04 ASSESSMENT — PAIN SCALES - GENERAL: PAINLEVEL_OUTOF10: 0-NO PAIN

## 2025-10-02 ENCOUNTER — APPOINTMENT (OUTPATIENT)
Dept: NEUROLOGY | Facility: CLINIC | Age: 35
End: 2025-10-02
Payer: COMMERCIAL

## 2026-02-06 ENCOUNTER — APPOINTMENT (OUTPATIENT)
Dept: UROLOGY | Facility: CLINIC | Age: 36
End: 2026-02-06
Payer: COMMERCIAL